# Patient Record
Sex: MALE | Race: WHITE | NOT HISPANIC OR LATINO | Employment: FULL TIME | URBAN - NONMETROPOLITAN AREA
[De-identification: names, ages, dates, MRNs, and addresses within clinical notes are randomized per-mention and may not be internally consistent; named-entity substitution may affect disease eponyms.]

---

## 2021-03-23 NOTE — PROGRESS NOTES
Assessment & Plan     Skin lesion  Discussed risk of malignancy.  Appointment with Dermatology scheduled for evaluation and recommendations for further management.   - DERMATOLOGY ADULT REFERRAL; Future    Tobacco use disorder  Begin Chantix as written.   - varenicline (CHANTIX DIYA) 0.5 MG X 11 & 1 MG X 42 tablet; Take 0.5 mg tab daily for 3 days, THEN 0.5 mg tab twice daily for 4 days, THEN 1 mg twice daily.  - varenicline (CHANTIX) 1 MG tablet; Take 1 tablet (1 mg) by mouth 2 times daily    Tobacco Cessation:   reports that he has been smoking cigarettes. He has been smoking about 1.00 pack per day. He has never used smokeless tobacco.  Tobacco Cessation Action Plan: Self help information given to patient    See Patient Instructions    No follow-ups on file.    Sheldon Landry MD  [facility] - BETHANY Reyes is a 50 year old who presents for the following health issues     HPI     Concern - Mole  Onset: 1-2 years  Description: Mole under right eye  Intensity: mild  Progression of Symptoms: getting larger  Accompanying Signs & Symptoms: none  Previous history of similar problem: none  Therapies tried and outcome: Mole got caught on facemask and ripped off 2 days ago, still has some remnant of mole    Eric noted a skin lesion as described above.  There is still remnants at the base.  No associated symptoms.     Patient is requesting information to quit smoking       Review of Systems   Constitutional, HEENT, cardiovascular, pulmonary, gi and gu systems are negative, except as otherwise noted.      Objective    There were no vitals taken for this visit.  There is no height or weight on file to calculate BMI.  Physical Exam  Vitals signs and nursing note reviewed.   Constitutional:       Appearance: He is well-developed.   Skin:     General: Skin is warm and dry.      Comments: There is a small slightly colored lesion noted inferior right eye.    Neurological:      Mental Status: He  is alert and oriented to person, place, and time.   Psychiatric:         Mood and Affect: Mood normal.         Behavior: Behavior normal.         Thought Content: Thought content normal.          No results found for any visits on 03/24/21.

## 2021-03-24 ENCOUNTER — OFFICE VISIT (OUTPATIENT)
Dept: FAMILY MEDICINE | Facility: OTHER | Age: 51
End: 2021-03-24
Attending: FAMILY MEDICINE
Payer: COMMERCIAL

## 2021-03-24 VITALS
HEART RATE: 74 BPM | BODY MASS INDEX: 26.5 KG/M2 | TEMPERATURE: 97.4 F | OXYGEN SATURATION: 95 % | RESPIRATION RATE: 20 BRPM | DIASTOLIC BLOOD PRESSURE: 84 MMHG | WEIGHT: 190 LBS | SYSTOLIC BLOOD PRESSURE: 128 MMHG

## 2021-03-24 DIAGNOSIS — F17.200 TOBACCO USE DISORDER: ICD-10-CM

## 2021-03-24 DIAGNOSIS — L98.9 SKIN LESION: Primary | ICD-10-CM

## 2021-03-24 PROCEDURE — 99213 OFFICE O/P EST LOW 20 MIN: CPT | Performed by: FAMILY MEDICINE

## 2021-03-24 RX ORDER — VARENICLINE TARTRATE 1 MG/1
1 TABLET, FILM COATED ORAL 2 TIMES DAILY
Qty: 60 TABLET | Refills: 3 | Status: SHIPPED | OUTPATIENT
Start: 2021-03-24 | End: 2021-12-23

## 2021-03-24 ASSESSMENT — PAIN SCALES - GENERAL: PAINLEVEL: NO PAIN (0)

## 2021-03-24 NOTE — NURSING NOTE
"Chief Complaint   Patient presents with     Mole       Initial /84 (BP Location: Right arm, Patient Position: Sitting, Cuff Size: Adult Regular)   Pulse 74   Temp 97.4  F (36.3  C) (Tympanic)   Resp 20   Wt 86.2 kg (190 lb)   SpO2 95%   BMI 26.50 kg/m   Estimated body mass index is 26.5 kg/m  as calculated from the following:    Height as of 1/5/15: 1.803 m (5' 11\").    Weight as of this encounter: 86.2 kg (190 lb).  Medication Reconciliation: complete  Angelo Perez LPN  "

## 2021-03-25 NOTE — PATIENT INSTRUCTIONS
"Appointment with Dermatology scheduled for evaluation and recommendations for further management.   Patient Education     Help for Smokers  What Are Your Reasons for Quitting?  Should I think about quitting smoking?  When it comes to lasting life change, readiness is everything. This may be the perfect time to quit using tobacco. If you have concerns about your health, this is your chance to reflect on your habits and make healthy changes.  Perhaps you have tried quitting in the past. It may help to think of quitting as a process that you take one day at a time. Every attempt brings you closer to success. And each time you try, you learn more about what works for you.  What are my reasons for quitting?  The first step in quitting is to decide why quitting is important to you. Here is one good reason:  \"Scientists have identified more than 7,000 chemicals and chemical compounds in tobacco smoke. At least 70 of them are known specifically to cause cancer.\"  --2014 Surgeon General Report  Consider health risks:    Smoking is the leading cause of heart disease, lung disease and stroke.    For the first time, women are as likely to die as men from many diseases caused by smoking.    Smoking can harm a person's health before birth and throughout his or her life.    Smoking causes cancer of the lungs, kidney, stomach, pancreas, cervix and bone marrow.    Smoking leads to impotence (loss of sexual function).    Smoking causes cataracts (clouding of the lens in the eye).    Smoking can cause you to lose teeth.    Smoking can lead to osteoporosis (brittle bone disease). This means a greater risk of broken bones. And if you smoke, your bones will heal more slowly.    Smoking leads to more infections. If you are healing from surgery, your incision has a greater chance of getting infected, which delays healing.    Smoking leads to more trips to the hospital--and longer stays.    Smokers are 30 to 40 percent more likely to " "develop type 2 diabetes than non-smokers.    About 3 out of 4 teen smokers become adult smokers, even if they plan to quit in a few years.    Secondhand smoke exposure is now known to cause strokes in nonsmokers.    More than 33,000 nonsmokers die every year in the [place] from coronary artery disease caused by exposure to secondhand smoke.  Consider personal reasons:    Smoking costs too much money.    My clothes stink.    I miss out on activities with my family because I am away smoking.    My breathing test showed I have the beginnings of emphysema.    A loved one  of cancer.    Smoking seems to control my life.  What other reasons do you have for quitting?  __________________________________________  __________________________________________  Does the way I talk to myself affect my ability to quit smoking?  If you tell yourself you can't quit, it will be very hard to quit. If you tell yourself you are a non-smoker, you will live up to that name.  Quitting happens one day at a time. When you feel a strong urge to smoke, think about your larger goal: to have a free and healthy life.  If I change my behaviors, am I more likely to succeed?  To go to the Super Bowl, a football team must have an action plan. Team members must prepare for the rojas to win. They have to have more than one play. And if they don't make it to the big game, they don't give up--they simply make a new plan for next time.  Like getting to the Super Bowl, quitting smoking calls for more than just willpower. Nicotine is a powerful, addictive drug. Withdrawal symptoms are much like those from cocaine or heroin. To fight them, you need many \"plays\" throughout the day.  Most people do not succeed the first time they try to quit smoking. It may take several tries, and you may need several action plans.  Start your day with a plan. Change how and where you do things. If you always smoke in a certain chair in the living room, don't go " "there. If you always have a cigarette with a drink, avoid drinking for a while. Other steps you might take:    Instead of smoking in the morning, brush your teeth when you first get up, then eat a healthy breakfast.    Instead of smoking in the car, stash some low-fat treats in the glove box. Or buy special music for the ride.    Instead of smoking after meals, clear the table and go for a walk.    Instead of smoking when you're under stress, do some deep-breathing exercises.  When you wake up the next day, start your action plan again. See yourself as a winner. Being free of nicotine puts you in control of your body and health. Don't take your eyes off that goal, even when the going gets rough.  What can I do about nicotine withdrawal?  Nicotine addiction is not about willpower or strength of character. It's about brain chemistry. Nicotine provides a \"kick,\" causing the brain to release chemicals into the body. These chemicals give you a feeling of pleasure. Depending on the dose, they can also make you feel calm. People smoke to keep high levels of these chemicals in the body. The pleasure they feel makes them want to keep smoking.  In the first 48 hours after quitting, nicotine withdrawal can be intense. You may feel irritable and have strong cigarette cravings. You might have a hard time going to sleep or concentrating while awake. You may want to eat more than usual.  The good news is, these symptoms peak within a few days. They should subside within a few weeks.  Do smoking aids help?  Yes. Using a smoking aid--such as a nicotine patch or a medicine like Chantix--can increase your chances for success. Ask your doctor which aid is best for you:    Nicotine patches    Nicotine gum or lozenges    Nicotine nasal spray or a nicotine inhaler (a plastic filter that you puff almost like a cigarette), prescribed by a doctor    Varenicline (Chantix) or bupropion (Zyban, Wellbutrin), prescribed by a doctor  Nicotine " replacement helps with the physical addiction. If you combine this with a quit-smoking program, you can double your chances for success.  What is my action plan?    Before you quit, make two lists: reasons you want to quit and reasons you don't. When your first list is twice as long as the second, you will be more likely to succeed.    Pick a quit date. Perhaps it was the day you came to the hospital or clinic.    Tell friends and family.    Stock up on carrots, sugar-free mints, cinnamon sticks and other items to keep your mouth busy.    Keep your hands busy with drawing, knitting, playing an instrument or other hobbies.    Find support. Will you use nicotine replacement? Attend a class? Join [employer]? Talk with friends who have quit?    Get rid of all cigarettes, lighters, ashtrays and other smoking items.    Keep active. Exercise will release pleasure chemicals in your brain, just like smoking did. Try walking, biking, gardening and other activities.    Drink lots of water.    Reduce or avoid alcohol.    Breathe deeply. When you smoked, you breathed the smoke deep into your lungs. Now picture your lungs filling with fresh, clean air.    Reward yourself with the money you saved by quitting. Go to a movie, take a vacation, buy a car.  Today, there are more former smokers than current smokers. Each year, over half of all daily smokers try to quit.  How quickly will my health improve?  Your body has an amazing way of healing itself over time, if you let it. Here are just a few of the long-term rewards of quitting. But don't forget--all these benefits will end if you light another cigarette.  The moment you stop smoking  The air around you is no longer harmful to children or other adults.  After 20 minutes  Your blood pressure and heart rate drop to normal.  Your hands and feet warm to a normal temperature.  After 8 hours  The carbon monoxide level in your blood drops to normal.  The oxygen level in your blood rises  to normal.  After 24 hours  Your chance of heart attack is lower.  After 48 hours  Your sense of smell and taste improve.  After 2 weeks to 3 months  Your blood flow improves.  It's easier for you to walk.  Your lungs work up to 30 percent better than before.  You catch colds and the flu less often.  After 1 to 9 months  You have less coughing and shortness of breath.  You have fewer sinus problems.  You have more energy.  You feel better about yourself because you've done what you set out to do.  After 1 year  You cut your risk of heart disease in half.  You lower your risk of cancer and lung disease.  You have fewer sick days and health problems.  You reduce the cost of your auto, home and life insurance.  After 10 years  Normal cells replace your pre-cancer cells.  You greatly lower your risk of mouth, larynx, lung and bladder cancer.  Your risk of artery disease is the same as if you had never smoked.  You--and anyone who was exposed to your second-hand smoke--will have a longer life expectancy.  For more information  QUITPLAN (0-855-647-PLAN), the [place] Tobacco Quit Line, provides free professional counseling over the phone.  www.quitplan.com  For informational purposes only. Not to replace the advice of your health care provider.  Copyright   2004 [facility]. All rights reserved. Clinically reviewed by Sultana Solis. [employer] [id number] - REV 08/18.

## 2021-05-06 ENCOUNTER — MYC MEDICAL ADVICE (OUTPATIENT)
Dept: FAMILY MEDICINE | Facility: OTHER | Age: 51
End: 2021-05-06

## 2021-05-06 DIAGNOSIS — H54.7 VISUAL PROBLEMS: Primary | ICD-10-CM

## 2021-05-09 ENCOUNTER — MYC MEDICAL ADVICE (OUTPATIENT)
Dept: FAMILY MEDICINE | Facility: OTHER | Age: 51
End: 2021-05-09

## 2021-05-09 DIAGNOSIS — L98.9 SKIN LESION: Primary | ICD-10-CM

## 2021-06-13 ENCOUNTER — HEALTH MAINTENANCE LETTER (OUTPATIENT)
Age: 51
End: 2021-06-13

## 2021-10-03 ENCOUNTER — HEALTH MAINTENANCE LETTER (OUTPATIENT)
Age: 51
End: 2021-10-03

## 2021-11-18 ENCOUNTER — OFFICE VISIT (OUTPATIENT)
Dept: FAMILY MEDICINE | Facility: OTHER | Age: 51
End: 2021-11-18
Attending: PHYSICIAN ASSISTANT
Payer: COMMERCIAL

## 2021-11-18 ENCOUNTER — NURSE TRIAGE (OUTPATIENT)
Dept: FAMILY MEDICINE | Facility: OTHER | Age: 51
End: 2021-11-18
Payer: COMMERCIAL

## 2021-11-18 ENCOUNTER — ANCILLARY PROCEDURE (OUTPATIENT)
Dept: GENERAL RADIOLOGY | Facility: OTHER | Age: 51
End: 2021-11-18
Attending: PHYSICIAN ASSISTANT
Payer: COMMERCIAL

## 2021-11-18 ENCOUNTER — MYC MEDICAL ADVICE (OUTPATIENT)
Dept: FAMILY MEDICINE | Facility: OTHER | Age: 51
End: 2021-11-18

## 2021-11-18 VITALS
DIASTOLIC BLOOD PRESSURE: 78 MMHG | HEIGHT: 71 IN | BODY MASS INDEX: 28 KG/M2 | SYSTOLIC BLOOD PRESSURE: 138 MMHG | TEMPERATURE: 97.3 F | OXYGEN SATURATION: 98 % | HEART RATE: 74 BPM | WEIGHT: 200 LBS

## 2021-11-18 DIAGNOSIS — R06.09 DOE (DYSPNEA ON EXERTION): Primary | ICD-10-CM

## 2021-11-18 DIAGNOSIS — R06.09 DOE (DYSPNEA ON EXERTION): ICD-10-CM

## 2021-11-18 PROCEDURE — 71046 X-RAY EXAM CHEST 2 VIEWS: CPT | Mod: TC | Performed by: RADIOLOGY

## 2021-11-18 PROCEDURE — 99214 OFFICE O/P EST MOD 30 MIN: CPT | Mod: 25 | Performed by: PHYSICIAN ASSISTANT

## 2021-11-18 PROCEDURE — 93000 ELECTROCARDIOGRAM COMPLETE: CPT | Performed by: INTERNAL MEDICINE

## 2021-11-18 ASSESSMENT — PAIN SCALES - GENERAL: PAINLEVEL: NO PAIN (0)

## 2021-11-18 ASSESSMENT — MIFFLIN-ST. JEOR: SCORE: 1794.32

## 2021-11-18 NOTE — PATIENT INSTRUCTIONS
Thank you for choosing [facility].   I have office hours 8:00 am to 4:30 pm on Monday's, Wednesday's, Thursday's and Friday's. My nurse and I are out of the office every Tuesday.    Following your visit, when your labs and diagnostic testing have returned, I will review then and you will be contacted by my nurse.  If you are on My Chart, you can also view results there.    For refills, notify your pharmacy regarding what you need and the pharmacy will generate a refill request. Do not call my nurse as she is unable to process refill request. Please plan ahead and allow 3-5 days for refill requests.    You will generally receive a reminder call the day prior to your appointment.  If you cannot attend your appointment, please cancel your appointment with as much notice as possible.  If there is a pattern of failure to present for your appointments, I cannot provide consistent, meaningful, ongoing care for you. It is very important to me that you come in for your care, so we can best assist you with your health care needs.    IMPORTANT:  Please note that it is my standard of practice to NOT participate in prescribing ongoing requested Narcotic Analgesic therapy, and/or participate in the prescribing of other controlled substances.  My nurse and I am happy to assist you with the process of referral for alternative pain management as needed, and other treatment modalities including but not limited to:  Physical Therapy, Physical Medicine and Rehab, Counseling, Chiropractic Care, Orthopedic Care, and non-narcotic medication management.     In the event that you need to be seen for emergent concerns and I am out of office,  please see one of my colleagues for acute concerns.  You may also present to  or ER.  I appreciate the opportunity to serve you and look forward to supporting your healthcare needs in the future. Please contact me with any questions or concerns that you may  have.    Sincerely,      Kristie Munson RN, PA-C

## 2021-11-18 NOTE — NURSING NOTE
"Chief Complaint   Patient presents with     Breathing Problem       Initial /78   Pulse 74   Temp 97.3  F (36.3  C)   Ht 1.803 m (5' 11\")   Wt 90.7 kg (200 lb)   SpO2 98%   BMI 27.89 kg/m   Estimated body mass index is 27.89 kg/m  as calculated from the following:    Height as of this encounter: 1.803 m (5' 11\").    Weight as of this encounter: 90.7 kg (200 lb).  Medication Reconciliation: complete  Silvana Rushing LPN  "

## 2021-11-18 NOTE — PROGRESS NOTES
"  Assessment & Plan     URBINA (dyspnea on exertion)  He is going to need some labs and ekg and xray.  Family hx of an aunt with heart disease. , he is going to be given PFT due to hx of asthma as a kid and long time tobacco use.  Will need this to be done in hospital.  Feels the dyspnea is different.  No associated sx of nausea , sweating or radiation of any pain or any chest pain.  We will however get an EKG and also a stress test.   Labs will be done fasting and smoking cessation was talked about but medicine is something that really bothers him when he try's to stop smoking.         - XR CHEST 2 VW (Clinic Performed); Future  - General PFT Lab (Please always keep checked); Future  - Pulmonary Function Test; Future  - EKG 12-lead complete w/read - (Clinic Performed)  - General PFT Lab (Please always keep checked)  - NM Lexiscan stress test; Future             BMI:   Estimated body mass index is 27.89 kg/m  as calculated from the following:    Height as of this encounter: 1.803 m (5' 11\").    Weight as of this encounter: 90.7 kg (200 lb).       See Patient Instructions    No follow-ups on file.    RUSLAN Beth  [facility] - BETHANY Reyes is a 50 year old who presents for the following health issues     HPI     Acute Illness  Acute illness concerns: SOB  Upon excertion  Onset/Duration: 1 month  Symptoms:  Fever: no  Chills/Sweats: no  Headache (location?): no  Sinus Pressure: no  Conjunctivitis:  no  Ear Pain: no  Rhinorrhea: no  Congestion: no  Sore Throat: no  Cough: yes  Wheeze: YES  Decreased Appetite: no  Nausea: no  Vomiting: no  Diarrhea: no  Dysuria/Freq.: no  Dysuria or Hematuria: no  Fatigue/Achiness: no  Sick/Strep Exposure: no  Therapies tried and outcome: None      Review of Systems   CONSTITUTIONAL: NEGATIVE for fever, chills, change in weight  INTEGUMENTARY/SKIN: NEGATIVE for worrisome rashes, moles or lesions  ENT/MOUTH: NEGATIVE for ear, mouth and throat " "problems  RESP:POSITIVE for cough-non productive, SOB/dyspnea and wheezing, his SOB feels much different from his asthma Shortness of breathe. Never has had Dyspnea at rest.   CV: NEGATIVE for chest pain, palpitations or peripheral edema  MUSCULOSKELETAL: NEGATIVE for significant arthralgias or myalgia  NEURO: NEGATIVE for weakness, dizziness or paresthesias  PSYCHIATRIC: has know anxiety. Not sleeping well.       Objective    /78   Pulse 74   Temp 97.3  F (36.3  C)   Ht 1.803 m (5' 11\")   Wt 90.7 kg (200 lb)   SpO2 98%   BMI 27.89 kg/m    Body mass index is 27.89 kg/m .  Physical Exam   GENERAL: healthy, alert and no distress  EYES: Eyes grossly normal to inspection, PERRL and conjunctivae and sclerae normal  HENT: ear canals and TM's normal, nose and mouth without ulcers or lesions  NECK: no adenopathy, no asymmetry, masses, or scars and thyroid normal to palpation  RESP: lungs clear to auscultation - no rales, rhonchi or wheezes  CV: regular rate and rhythm, normal S1 S2, no S3 or S4, no murmur, click or rub, no peripheral edema and peripheral pulses strong  ABDOMEN: soft, nontender, no hepatosplenomegaly, no masses and bowel sounds normal  MS: no gross musculoskeletal defects noted, no edema  SKIN: no suspicious lesions or rashes  NEURO: Normal strength and tone, mentation intact and speech normal  PSYCH: mentation appears normal, affect normal/bright                "

## 2021-11-18 NOTE — TELEPHONE ENCOUNTER
"Cell phone [phone]      Patient denies covid exposure or covid symptoms.  Patient is vaccinated for covid.  He has been sob the past month and decided today it has gone on long enough and should be check out.  Denies chest pain.  Patient requesting to be seen asap.  Reason for Disposition    Patient wants to be seen    MILD difficulty breathing (e.g., minimal/no SOB at rest, SOB with walking, pulse < 100) of new onset or worse than normal    Answer Assessment - Initial Assessment Questions  1. RESPIRATORY STATUS: \"Describe your breathing?\" (e.g., wheezing, shortness of breath, unable to speak, severe coughing)       Patient feels like he just ran a marathon or sob when climboing stairs  2. ONSET: \"When did this breathing problem begin?\"       One month ago  3. PATTERN \"Does the difficult breathing come and go, or has it been constant since it started?\"       Continuous upon exertion, walking, climbing stairs, not at rest  4. SEVERITY: \"How bad is your breathing?\" (e.g., mild, moderate, severe)     - MILD: No SOB at rest, mild SOB with walking, speaks normally in sentences, can lay down, no retractions, pulse < 100.     - MODERATE: SOB at rest, SOB with minimal exertion and prefers to sit, cannot lie down flat, speaks in phrases, mild retractions, audible wheezing, pulse 100-120.     - SEVERE: Very SOB at rest, speaks in single words, struggling to breathe, sitting hunched forward, retractions, pulse > 120       mild  5. RECURRENT SYMPTOM: \"Have you had difficulty breathing before?\" If so, ask: \"When was the last time?\" and \"What happened that time?\"       no  6. CARDIAC HISTORY: \"Do you have any history of heart disease?\" (e.g., heart attack, angina, bypass surgery, angioplasty)       no  7. LUNG HISTORY: \"Do you have any history of lung disease?\"  (e.g., pulmonary embolus, asthma, emphysema)      no  8. CAUSE: \"What do you think is causing the breathing problem?\"       unknown  9. OTHER SYMPTOMS: \"Do you have " "any other symptoms? (e.g., dizziness, runny nose, cough, chest pain, fever)      no  10. PREGNANCY: \"Is there any chance you are pregnant?\" \"When was your last menstrual period?\"        no  11. TRAVEL: \"Have you traveled out of the country in the last month?\" (e.g., travel history, exposures)        no    Protocols used: BREATHING DIFFICULTY-A-[place]      "

## 2021-11-22 ENCOUNTER — LAB (OUTPATIENT)
Dept: LAB | Facility: OTHER | Age: 51
End: 2021-11-22
Payer: COMMERCIAL

## 2021-11-22 DIAGNOSIS — R06.09 DOE (DYSPNEA ON EXERTION): ICD-10-CM

## 2021-11-22 LAB
ALBUMIN SERPL-MCNC: 3.9 G/DL (ref 3.4–5)
ALP SERPL-CCNC: 53 U/L (ref 40–150)
ALT SERPL W P-5'-P-CCNC: 48 U/L (ref 0–70)
ANION GAP SERPL CALCULATED.3IONS-SCNC: 4 MMOL/L (ref 3–14)
AST SERPL W P-5'-P-CCNC: 22 U/L (ref 0–45)
BASOPHILS # BLD AUTO: 0.1 10E3/UL (ref 0–0.2)
BASOPHILS NFR BLD AUTO: 1 %
BILIRUB SERPL-MCNC: 0.6 MG/DL (ref 0.2–1.3)
BUN SERPL-MCNC: 11 MG/DL (ref 7–30)
CALCIUM SERPL-MCNC: 8.7 MG/DL (ref 8.5–10.1)
CHLORIDE BLD-SCNC: 109 MMOL/L (ref 94–109)
CHOLEST SERPL-MCNC: 262 MG/DL
CO2 SERPL-SCNC: 27 MMOL/L (ref 20–32)
CREAT SERPL-MCNC: 0.96 MG/DL (ref 0.66–1.25)
EOSINOPHIL # BLD AUTO: 0.2 10E3/UL (ref 0–0.7)
EOSINOPHIL NFR BLD AUTO: 2 %
ERYTHROCYTE [DISTWIDTH] IN BLOOD BY AUTOMATED COUNT: 12.7 % (ref 10–15)
FASTING STATUS PATIENT QL REPORTED: YES
GFR SERPL CREATININE-BSD FRML MDRD: >90 ML/MIN/1.73M2
GLUCOSE BLD-MCNC: 94 MG/DL (ref 70–99)
HCT VFR BLD AUTO: 49.2 % (ref 40–53)
HDLC SERPL-MCNC: 47 MG/DL
HGB BLD-MCNC: 17.2 G/DL (ref 13.3–17.7)
IMM GRANULOCYTES # BLD: 0 10E3/UL
IMM GRANULOCYTES NFR BLD: 0 %
LDLC SERPL CALC-MCNC: 179 MG/DL
LYMPHOCYTES # BLD AUTO: 2.2 10E3/UL (ref 0.8–5.3)
LYMPHOCYTES NFR BLD AUTO: 26 %
MCH RBC QN AUTO: 32.3 PG (ref 26.5–33)
MCHC RBC AUTO-ENTMCNC: 35 G/DL (ref 31.5–36.5)
MCV RBC AUTO: 93 FL (ref 78–100)
MONOCYTES # BLD AUTO: 0.5 10E3/UL (ref 0–1.3)
MONOCYTES NFR BLD AUTO: 6 %
NEUTROPHILS # BLD AUTO: 5.4 10E3/UL (ref 1.6–8.3)
NEUTROPHILS NFR BLD AUTO: 65 %
NONHDLC SERPL-MCNC: 215 MG/DL
NRBC # BLD AUTO: 0 10E3/UL
NRBC BLD AUTO-RTO: 0 /100
PLATELET # BLD AUTO: 280 10E3/UL (ref 150–450)
POTASSIUM BLD-SCNC: 4 MMOL/L (ref 3.4–5.3)
PROT SERPL-MCNC: 8 G/DL (ref 6.8–8.8)
RBC # BLD AUTO: 5.32 10E6/UL (ref 4.4–5.9)
SODIUM SERPL-SCNC: 140 MMOL/L (ref 133–144)
TRIGL SERPL-MCNC: 178 MG/DL
TSH SERPL DL<=0.005 MIU/L-ACNC: 1.22 MU/L (ref 0.4–4)
WBC # BLD AUTO: 8.4 10E3/UL (ref 4–11)

## 2021-11-22 PROCEDURE — 80050 GENERAL HEALTH PANEL: CPT

## 2021-11-22 PROCEDURE — 80061 LIPID PANEL: CPT

## 2021-11-22 PROCEDURE — 36415 COLL VENOUS BLD VENIPUNCTURE: CPT

## 2021-12-14 ENCOUNTER — HOSPITAL ENCOUNTER (OUTPATIENT)
Dept: NUCLEAR MEDICINE | Facility: HOSPITAL | Age: 51
Setting detail: NUCLEAR MEDICINE
End: 2021-12-14
Attending: PHYSICIAN ASSISTANT
Payer: COMMERCIAL

## 2021-12-14 ENCOUNTER — HOSPITAL ENCOUNTER (OUTPATIENT)
Dept: CARDIOLOGY | Facility: HOSPITAL | Age: 51
Setting detail: NUCLEAR MEDICINE
End: 2021-12-14
Attending: PHYSICIAN ASSISTANT
Payer: COMMERCIAL

## 2021-12-14 DIAGNOSIS — R06.09 DOE (DYSPNEA ON EXERTION): ICD-10-CM

## 2021-12-14 LAB
CV BLOOD PRESSURE: 65 MMHG
CV STRESS MAX HR HE: 80
NUC STRESS EJECTION FRACTION: 59 %
RATE PRESSURE PRODUCT: 8160
STRESS ECHO BASELINE DIASTOLIC HE: 72
STRESS ECHO BASELINE HR: 54 BPM
STRESS ECHO BASELINE SYSTOLIC BP: 110
STRESS ECHO CALCULATED PERCENT HR: 47 %
STRESS ECHO LAST STRESS DIASTOLIC BP: 70
STRESS ECHO LAST STRESS SYSTOLIC BP: 102
STRESS ECHO TARGET HR: 170

## 2021-12-14 PROCEDURE — 93016 CV STRESS TEST SUPVJ ONLY: CPT | Performed by: INTERNAL MEDICINE

## 2021-12-14 PROCEDURE — 343N000001 HC RX 343: Performed by: RADIOLOGY

## 2021-12-14 PROCEDURE — A9500 TC99M SESTAMIBI: HCPCS | Performed by: RADIOLOGY

## 2021-12-14 PROCEDURE — 250N000011 HC RX IP 250 OP 636: Performed by: INTERNAL MEDICINE

## 2021-12-14 PROCEDURE — 78452 HT MUSCLE IMAGE SPECT MULT: CPT

## 2021-12-14 PROCEDURE — 93017 CV STRESS TEST TRACING ONLY: CPT | Performed by: INTERNAL MEDICINE

## 2021-12-14 PROCEDURE — 93018 CV STRESS TEST I&R ONLY: CPT | Performed by: INTERNAL MEDICINE

## 2021-12-14 RX ORDER — AMINOPHYLLINE 25 MG/ML
INJECTION, SOLUTION INTRAVENOUS
Status: DISCONTINUED
Start: 2021-12-14 | End: 2021-12-14 | Stop reason: WASHOUT

## 2021-12-14 RX ORDER — REGADENOSON 0.08 MG/ML
0.4 INJECTION, SOLUTION INTRAVENOUS ONCE
Status: COMPLETED | OUTPATIENT
Start: 2021-12-14 | End: 2021-12-14

## 2021-12-14 RX ADMIN — Medication 30.8 MILLICURIE: at 09:15

## 2021-12-14 RX ADMIN — REGADENOSON 0.4 MG: 0.08 INJECTION, SOLUTION INTRAVENOUS at 09:17

## 2021-12-14 RX ADMIN — Medication 10.8 MILLICURIE: at 07:14

## 2021-12-17 NOTE — PROGRESS NOTES
Assessment & Plan     URBINA (dyspnea on exertion)  He shows not changes on his cardiac work up.  He has great pressure and a slow heart beat.  EF is good. He is now going on to work up his breathing.  Concern for deconditioning as well.  We will get him ion an exercise program if he is ok on his lungs as well. If not medication as appropriate. Discussion on smoking as a problem here. chantix he quit due to panic and anxiety     Special screening for malignant neoplasms, colon  Hx of diverticulosis never had a scope and is 51 recommended screening.  See us back as recommended.   - Adult General Surg Referral    Review of external notes as documented elsewhere in note  Ordering of each unique test  Prescription drug management  10  minutes spent on the date of the encounter doing chart review, review of test results, interpretation of tests, patient visit and documentation        See Patient Instructions    No follow-ups on file.    RUSLAN Beth  [facility] - BETHANY Reyes is a 51 year old who presents for the following health issues     HPI     Concern - Dyspnea on exertion   Onset: 11/18/2021  Description: short of breath  Intensity: mild  Progression of Symptoms:  Is still feeling short of breath after any kind of exercise, has steps at work, and after shoveling.   Accompanying Signs & Symptoms: none  Previous history of similar problem: none  Precipitating factors:        Worsened by: none  Alleviating factors:        Improved by: none  Therapies tried and outcome: None        Review of Systems   CONSTITUTIONAL: NEGATIVE for fever, chills, change in weight  INTEGUMENTARY/SKIN: no changes in skin   ENT/MOUTH: NEGATIVE for ear, mouth and throat problems  RESP: NEGATIVE for significant cough or SOB  CV: NEGATIVE for chest pain, palpitations or peripheral edema  PSYCHIATRIC: NEGATIVE for changes in mood or affect      Objective    /80 (BP Location: Left arm, Patient  "Position: Sitting, Cuff Size: Adult Regular)   Pulse 70   Temp 97.1  F (36.2  C) (Tympanic)   Ht 1.803 m (5' 11\")   Wt 90.7 kg (200 lb)   SpO2 98%   BMI 27.89 kg/m    Body mass index is 27.89 kg/m .  Physical Exam   GENERAL: healthy, alert and no distress  EYES: Eyes grossly normal to inspection, PERRL and conjunctivae and sclerae normal  HENT: ear canals and TM's normal, nose and mouth without ulcers or lesions  NECK: no adenopathy, no asymmetry, masses, or scars and thyroid normal to palpation  RESP: lungs clear to auscultation - no rales, rhonchi or wheezes  CV: regular rate and rhythm, normal S1 S2, no S3 or S4, no murmur, click or rub, no peripheral edema and peripheral pulses strong  MS: no gross musculoskeletal defects noted, no edema  SKIN: no suspicious lesions or rashes  NEURO: Normal strength and tone, mentation intact and speech normal  PSYCH: mentation appears normal, affect normal/[facility]    Hospital Outpatient Visit on 12/14/2021   Component Date Value Ref Range Status     Target HR 12/14/2021 170   Final     Baseline Systolic BP 12/14/2021 110   Final     Baseline Diastolic BP 12/14/2021 72   Final     Last Stress Systolic BP 12/14/2021 102   Final     Last Stress Diastolic BP 12/14/2021 70   Final     Baseline HR 12/14/2021 54  bpm Final     Max HR  12/14/2021 80   Final     Max Perdicted HR  12/14/2021 47  % Final     Rate Pressure Product 12/14/2021 8,160.0   Final     BP 12/14/2021 65  mmHg Final     Left Ventricular EF 12/14/2021 59  % Final               "

## 2021-12-23 ENCOUNTER — OFFICE VISIT (OUTPATIENT)
Dept: FAMILY MEDICINE | Facility: OTHER | Age: 51
End: 2021-12-23
Attending: PHYSICIAN ASSISTANT
Payer: COMMERCIAL

## 2021-12-23 VITALS
HEART RATE: 70 BPM | DIASTOLIC BLOOD PRESSURE: 80 MMHG | SYSTOLIC BLOOD PRESSURE: 126 MMHG | OXYGEN SATURATION: 98 % | WEIGHT: 200 LBS | BODY MASS INDEX: 28 KG/M2 | HEIGHT: 71 IN | TEMPERATURE: 97.1 F

## 2021-12-23 DIAGNOSIS — Z12.11 SPECIAL SCREENING FOR MALIGNANT NEOPLASMS, COLON: ICD-10-CM

## 2021-12-23 DIAGNOSIS — E78.5 HYPERLIPIDEMIA LDL GOAL <100: ICD-10-CM

## 2021-12-23 DIAGNOSIS — R06.09 DOE (DYSPNEA ON EXERTION): Primary | ICD-10-CM

## 2021-12-23 PROCEDURE — 99213 OFFICE O/P EST LOW 20 MIN: CPT | Performed by: PHYSICIAN ASSISTANT

## 2021-12-23 ASSESSMENT — PAIN SCALES - GENERAL: PAINLEVEL: NO PAIN (0)

## 2021-12-23 ASSESSMENT — MIFFLIN-ST. JEOR: SCORE: 1789.32

## 2021-12-23 NOTE — NURSING NOTE
"Chief Complaint   Patient presents with     Breathing Problem       Initial /80 (BP Location: Left arm, Patient Position: Sitting, Cuff Size: Adult Regular)   Pulse 70   Temp 97.1  F (36.2  C) (Tympanic)   Ht 1.803 m (5' 11\")   Wt 90.7 kg (200 lb)   SpO2 98%   BMI 27.89 kg/m   Estimated body mass index is 27.89 kg/m  as calculated from the following:    Height as of this encounter: 1.803 m (5' 11\").    Weight as of this encounter: 90.7 kg (200 lb).  Medication Reconciliation: complete  Lakeisha Lagos LPN  "

## 2022-01-28 ENCOUNTER — TELEPHONE (OUTPATIENT)
Dept: SURGERY | Facility: OTHER | Age: 52
End: 2022-01-28
Payer: COMMERCIAL

## 2022-01-28 NOTE — TELEPHONE ENCOUNTER
Referral received for colonoscopy for screening.   This patient was approved by Shannan Hahn, surgery education RN for meet and greet colonoscopy without preop appointment.   1st attempt to call patient to schedule. No answer. Left message for patient to return call. My-chart message also sent with Gatorade instructions.

## 2022-03-14 ENCOUNTER — TELEPHONE (OUTPATIENT)
Dept: SURGERY | Facility: OTHER | Age: 52
End: 2022-03-14
Payer: COMMERCIAL

## 2022-03-14 NOTE — TELEPHONE ENCOUNTER
Referral received for colonoscopy for special screening for malignant neoplasms, colon.   This patient was approved by Shannan, surgery education RN for meet and greet without preop.   This is the 2nd attempt by phone to schedule meet and greet colonoscopy. No answer.   Courtesy letter with contact information sent requesting patient to call office to schedule colonoscopy/consult at patient's convenience.     Referring provider notified that patient has not yet been scheduled for colonoscopy or consult after attempts have been made schedule by phone.

## 2022-07-09 ENCOUNTER — HEALTH MAINTENANCE LETTER (OUTPATIENT)
Age: 52
End: 2022-07-09

## 2022-09-04 ENCOUNTER — HEALTH MAINTENANCE LETTER (OUTPATIENT)
Age: 52
End: 2022-09-04

## 2022-10-30 ENCOUNTER — MYC MEDICAL ADVICE (OUTPATIENT)
Dept: FAMILY MEDICINE | Facility: OTHER | Age: 52
End: 2022-10-30

## 2022-10-31 ENCOUNTER — MYC MEDICAL ADVICE (OUTPATIENT)
Dept: FAMILY MEDICINE | Facility: OTHER | Age: 52
End: 2022-10-31

## 2022-10-31 ENCOUNTER — NURSE TRIAGE (OUTPATIENT)
Dept: FAMILY MEDICINE | Facility: OTHER | Age: 52
End: 2022-10-31

## 2022-10-31 NOTE — TELEPHONE ENCOUNTER
Protocol:  See in office today or tomorrow    Sent to PCP to review & advise if a work-into the schedule is warranted.            Reason for Disposition    MILD pain (e.g., does not interfere with normal activities) and pain comes and goes (cramps) lasts > 48 hours  (Exception: This same abdominal pain is a chronic symptom recurrent or ongoing AND present > 4 weeks.)    Additional Information    Negative: Passed out (i.e., fainted, collapsed and was not responding)    Negative: Shock suspected (e.g., cold/pale/clammy skin, too weak to stand, low BP, rapid pulse)    Negative: Sounds like a life-threatening emergency to the triager    Negative: Chest pain    Negative: Pain is mainly in upper abdomen (if needed ask: 'is it mainly above the belly button?')    Negative: SEVERE abdominal pain (e.g., excruciating)    Negative: Vomiting red blood or black (coffee ground) material    Negative: Bloody, black, or tarry bowel movements  (Exception: Chronic-unchanged black-grey bowel movements and is taking iron pills or Pepto-Bismol.)    Negative: Unable to urinate (or only a few drops) and bladder feels very full    Negative: Pain in scrotum persists > 1 hour    Negative: Constant abdominal pain lasting > 2 hours    Negative: Vomiting bile (green color)    Negative: Patient sounds very sick or weak to the triager    Negative: Vomiting and abdomen looks much more swollen than usual    Negative: White of the eyes have turned yellow (i.e., jaundice)    Negative: Blood in urine (red, pink, or tea-colored)    Negative: Fever > 103 F (39.4 C)    Negative: Fever > 101 F (38.3 C) and over 60 years of age    Negative: Fever > 100.0 F (37.8 C) and has diabetes mellitus or a weak immune system (e.g., HIV positive, cancer chemotherapy, organ transplant, splenectomy, chronic steroids)    Negative: Fever > 100.0 F (37.8 C) and bedridden (e.g., nursing home patient, stroke, chronic illness, recovering from surgery)    Negative: MODERATE pain  "(e.g., interferes with normal activities) that comes and goes (cramps) lasts > 24 hours  (Exception: pain with Vomiting or Diarrhea - see that Protocol)    Negative: Age > 60 years    Negative: Patient wants to be seen    Answer Assessment - Initial Assessment Questions  1. LOCATION: \"Where does it hurt?\"       Bilat mid abd  2. RADIATION: \"Does the pain shoot anywhere else?\" (e.g., chest, back)      No  3. ONSET: \"When did the pain begin?\" (Minutes, hours or days ago)       Approx 10/29  4. SUDDEN: \"Gradual or sudden onset?\"      Started with a fever, 101.F  Then remained  5. PATTERN \"Does the pain come and go, or is it constant?\"     - If constant: \"Is it getting better, staying the same, or worsening?\"       (Note: Constant means the pain never goes away completely; most serious pain is constant and it progresses)      - If intermittent: \"How long does it last?\" \"Do you have pain now?\"      (Note: Intermittent means the pain goes away completely between bouts)      Slight increase with movement.  6. SEVERITY: \"How bad is the pain?\"  (e.g., Scale 1-10; mild, moderate, or severe)     - MILD (1-3): doesn't interfere with normal activities, abdomen soft and not tender to touch      - MODERATE (4-7): interferes with normal activities or awakens from sleep, abdomen tender to touch      - SEVERE (8-10): excruciating pain, doubled over, unable to do any normal activities        moderate  7. RECURRENT SYMPTOM: \"Have you ever had this type of stomach pain before?\" If Yes, ask: \"When was the last time?\" and \"What happened that time?\"       Felt these symptoms approx two weeks ago  8. CAUSE: \"What do you think is causing the stomach pain?\"      unknown  9. RELIEVING/AGGRAVATING FACTORS: \"What makes it better or worse?\" (e.g., movement, antacids, bowel movement)      Taking MOM, BM's are formed to soft  10. OTHER SYMPTOMS: \"Do you have any other symptoms?\" (e.g., back pain, diarrhea, fever, urination pain, vomiting)        " Fever this episode.    Protocols used: ABDOMINAL PAIN - MALE-A-OH

## 2022-11-01 ENCOUNTER — TELEPHONE (OUTPATIENT)
Dept: SURGERY | Facility: OTHER | Age: 52
End: 2022-11-01

## 2022-11-01 ENCOUNTER — OFFICE VISIT (OUTPATIENT)
Dept: FAMILY MEDICINE | Facility: OTHER | Age: 52
End: 2022-11-01
Attending: STUDENT IN AN ORGANIZED HEALTH CARE EDUCATION/TRAINING PROGRAM
Payer: COMMERCIAL

## 2022-11-01 VITALS
BODY MASS INDEX: 27.2 KG/M2 | RESPIRATION RATE: 18 BRPM | HEART RATE: 83 BPM | TEMPERATURE: 98.5 F | DIASTOLIC BLOOD PRESSURE: 62 MMHG | WEIGHT: 195 LBS | SYSTOLIC BLOOD PRESSURE: 118 MMHG | OXYGEN SATURATION: 98 %

## 2022-11-01 DIAGNOSIS — R10.32 ABDOMINAL PAIN, LEFT LOWER QUADRANT: Primary | ICD-10-CM

## 2022-11-01 DIAGNOSIS — K57.30 DIVERTICULOSIS OF LARGE INTESTINE WITHOUT HEMORRHAGE: Chronic | ICD-10-CM

## 2022-11-01 PROBLEM — E78.5 HYPERLIPIDEMIA LDL GOAL <100: Chronic | Status: ACTIVE | Noted: 2021-12-23

## 2022-11-01 LAB
ALBUMIN SERPL BCG-MCNC: 4 G/DL (ref 3.5–5.2)
ALBUMIN UR-MCNC: NEGATIVE MG/DL
ALP SERPL-CCNC: 55 U/L (ref 40–129)
ALT SERPL W P-5'-P-CCNC: 26 U/L (ref 10–50)
ANION GAP SERPL CALCULATED.3IONS-SCNC: 9 MMOL/L (ref 7–15)
APPEARANCE UR: CLEAR
AST SERPL W P-5'-P-CCNC: 20 U/L (ref 10–50)
BASOPHILS # BLD AUTO: 0.1 10E3/UL (ref 0–0.2)
BASOPHILS NFR BLD AUTO: 1 %
BILIRUB SERPL-MCNC: 0.4 MG/DL
BILIRUB UR QL STRIP: NEGATIVE
BUN SERPL-MCNC: 11.3 MG/DL (ref 6–20)
CALCIUM SERPL-MCNC: 8.9 MG/DL (ref 8.6–10)
CHLORIDE SERPL-SCNC: 104 MMOL/L (ref 98–107)
COLOR UR AUTO: YELLOW
CREAT SERPL-MCNC: 1.01 MG/DL (ref 0.67–1.17)
CRP SERPL-MCNC: 60.65 MG/L
DEPRECATED HCO3 PLAS-SCNC: 25 MMOL/L (ref 22–29)
EOSINOPHIL # BLD AUTO: 0.2 10E3/UL (ref 0–0.7)
EOSINOPHIL NFR BLD AUTO: 3 %
ERYTHROCYTE [DISTWIDTH] IN BLOOD BY AUTOMATED COUNT: 12.8 % (ref 10–15)
GFR SERPL CREATININE-BSD FRML MDRD: >90 ML/MIN/1.73M2
GLUCOSE SERPL-MCNC: 105 MG/DL (ref 70–99)
GLUCOSE UR STRIP-MCNC: NEGATIVE MG/DL
HCT VFR BLD AUTO: 47 % (ref 40–53)
HGB BLD-MCNC: 16.1 G/DL (ref 13.3–17.7)
HGB UR QL STRIP: NEGATIVE
HYALINE CASTS: 1 /LPF
IMM GRANULOCYTES # BLD: 0 10E3/UL
IMM GRANULOCYTES NFR BLD: 0 %
KETONES UR STRIP-MCNC: NEGATIVE MG/DL
LEUKOCYTE ESTERASE UR QL STRIP: NEGATIVE
LYMPHOCYTES # BLD AUTO: 2 10E3/UL (ref 0.8–5.3)
LYMPHOCYTES NFR BLD AUTO: 25 %
MCH RBC QN AUTO: 32.5 PG (ref 26.5–33)
MCHC RBC AUTO-ENTMCNC: 34.3 G/DL (ref 31.5–36.5)
MCV RBC AUTO: 95 FL (ref 78–100)
MONOCYTES # BLD AUTO: 0.6 10E3/UL (ref 0–1.3)
MONOCYTES NFR BLD AUTO: 7 %
MUCOUS THREADS #/AREA URNS LPF: PRESENT /LPF
NEUTROPHILS # BLD AUTO: 5.3 10E3/UL (ref 1.6–8.3)
NEUTROPHILS NFR BLD AUTO: 64 %
NITRATE UR QL: NEGATIVE
NRBC # BLD AUTO: 0 10E3/UL
NRBC BLD AUTO-RTO: 0 /100
PH UR STRIP: 6 [PH] (ref 4.7–8)
PLATELET # BLD AUTO: 272 10E3/UL (ref 150–450)
POTASSIUM SERPL-SCNC: 4.2 MMOL/L (ref 3.4–5.3)
PROT SERPL-MCNC: 7.4 G/DL (ref 6.4–8.3)
RBC # BLD AUTO: 4.96 10E6/UL (ref 4.4–5.9)
RBC URINE: <1 /HPF
SODIUM SERPL-SCNC: 138 MMOL/L (ref 136–145)
SP GR UR STRIP: 1.02 (ref 1–1.03)
SQUAMOUS EPITHELIAL: 0 /HPF
UROBILINOGEN UR STRIP-MCNC: NORMAL MG/DL
WBC # BLD AUTO: 8.3 10E3/UL (ref 4–11)
WBC URINE: 5 /HPF

## 2022-11-01 PROCEDURE — 36415 COLL VENOUS BLD VENIPUNCTURE: CPT | Performed by: STUDENT IN AN ORGANIZED HEALTH CARE EDUCATION/TRAINING PROGRAM

## 2022-11-01 PROCEDURE — 80053 COMPREHEN METABOLIC PANEL: CPT | Performed by: STUDENT IN AN ORGANIZED HEALTH CARE EDUCATION/TRAINING PROGRAM

## 2022-11-01 PROCEDURE — 81001 URINALYSIS AUTO W/SCOPE: CPT | Performed by: STUDENT IN AN ORGANIZED HEALTH CARE EDUCATION/TRAINING PROGRAM

## 2022-11-01 PROCEDURE — 99214 OFFICE O/P EST MOD 30 MIN: CPT | Performed by: STUDENT IN AN ORGANIZED HEALTH CARE EDUCATION/TRAINING PROGRAM

## 2022-11-01 PROCEDURE — 85025 COMPLETE CBC W/AUTO DIFF WBC: CPT | Performed by: STUDENT IN AN ORGANIZED HEALTH CARE EDUCATION/TRAINING PROGRAM

## 2022-11-01 PROCEDURE — 86140 C-REACTIVE PROTEIN: CPT | Performed by: STUDENT IN AN ORGANIZED HEALTH CARE EDUCATION/TRAINING PROGRAM

## 2022-11-01 ASSESSMENT — PAIN SCALES - GENERAL: PAINLEVEL: MILD PAIN (2)

## 2022-11-01 NOTE — TELEPHONE ENCOUNTER
Spoke to Cris ([phone]). She will give Eric the options & let him call us back. I gave her the direct number to give him to call us back. [phone]

## 2022-11-01 NOTE — PROGRESS NOTES
Assessment & Plan     Abdominal pain, left lower quadrant  Diverticulosis of large intestine without hemorrhage  4 days into recent episode, had intense left lower quadrant pain with a fever Saturday, has improved since then, although still some lingering discomfort.  Presently no nausea or changes in stool.  No overt red flag findings.  CBC, CMP reassuring.  UA negative.  CRP is elevated, possibly related to resolving flare versus less likely inflammatory bowel disease.  Unable to get CT scan this afternoon per radiology, scheduled for tomorrow morning. Strong suspicion for recurring diverticulitis, as diverticulosis of sigmoid previously noted on CT. Signs I would indicate urgent repeat evaluation discussed and he would present to the emergency department if imaging needed sooner.  Encouraged him to continue to hydrate and stick to a more liquid diet until symptoms improve further.  Does have an appointment with general surgery in 2 days, previously scheduled. Will plan follow up pending CT scan.   - CT Abdomen Pelvis w Contrast; Future  - UA with Microscopic reflex to Culture - [place]  - CRP, inflammation  - Comprehensive metabolic panel  - CBC with Platelets & Differential      I spent a total of 35 minutes on the day of the visit.   Time spent doing chart review, history and exam, documentation and further activities per the note       Sabrina Dill MD  [facility] - [place]    Ilan Reyes is a 51 year old, presenting for the following health issues:  Abdominal Pain      HPI   ABDOMINAL PAIN (and/or Flank Pain)    Onset: 2 week(s) ago noticed a flare, has been told that he has diverticulosis    Description:   Location: bilateral lower quadrant pelvic region  Radiation: None  Character: Stabbing and Cramping episodes that last 2 days.    Frequency (if intermittent):not applicable        Pain-free between episodes: YES    History:    Previous similar pain? YES   Previous tests done? Not  sure   Any related trauma?: no    Accompanying Signs & Symptoms:   Fever? YES - one day on Saturday   Nausea No  Vomiting (bloody?, coffee-grounds?) no  Dysuria? No  Hematuria: No  Change in stool color: no  Change in stool pattern: no, felt constipated 4-5 weeks ago   Weight loss: No     Precipitating and/or Alleviating factors:   Does the pain change with: Food No                                                BM No                                                Body movement No                                                 Urination No    Therapies tried and outcome: milk of mag with  moderate relief    Started with a fever on Saturday   Pain was in general abdomen, more lower on both sides  Pain is constant - does get a local spot he can feel with episodes on left side   Maybe a little back pain  No dysuria, no frequency  History of kidney stones  Has colonoscopy consult on Thursday   Last happened two weekends ago - resolved in a few days   Seems to flare up every 6 weeks to 2 months  Flare ups have become more regular over the past year   Poops are normal, not bloody or mucosy   Has done milk of magnesia, ?if it helps  Pain 6/10 initially, 3/10 now, improved   No previous imaging showing diverticulitis, only showed diverticulosis           Objective    /62   Pulse 83   Temp 98.5  F (36.9  C) (Tympanic)   Resp 18   Wt 88.5 kg (195 lb)   SpO2 98%   BMI 27.20 kg/m    Body mass index is 27.2 kg/m .     Physical Exam  Constitutional:       General: He is not in acute distress.     Appearance: Normal appearance. He is not ill-appearing.   Eyes:      General: No scleral icterus.     Conjunctiva/sclera: Conjunctivae normal.   Cardiovascular:      Rate and Rhythm: Normal rate and regular rhythm.      Heart sounds: No murmur heard.  Pulmonary:      Effort: Pulmonary effort is normal.      Breath sounds: Normal breath sounds.   Abdominal:      General: Abdomen is flat. There is no distension.       Palpations: There is no hepatomegaly, splenomegaly or mass.      Tenderness: There is abdominal tenderness (LLQ). There is no right CVA tenderness, left CVA tenderness, guarding or rebound. Negative signs include Dominique's sign.      Hernia: There is no hernia in the umbilical area or ventral area.   Skin:     Capillary Refill: Capillary refill takes less than 2 seconds.      Coloration: Skin is not jaundiced.      Findings: No rash.   Neurological:      General: No focal deficit present.      Mental Status: He is alert and oriented to person, place, and time.            Results for orders placed or performed in visit on 11/01/22 (from the past 24 hour(s))   CRP, inflammation   Result Value Ref Range    CRP Inflammation 60.65 (H) <5.00 mg/L   Comprehensive metabolic panel   Result Value Ref Range    Sodium 138 136 - 145 mmol/L    Potassium 4.2 3.4 - 5.3 mmol/L    Chloride 104 98 - 107 mmol/L    Carbon Dioxide (CO2) 25 22 - 29 mmol/L    Anion Gap 9 7 - 15 mmol/L    Urea Nitrogen 11.3 6.0 - 20.0 mg/dL    Creatinine 1.01 0.67 - 1.17 mg/dL    Calcium 8.9 8.6 - 10.0 mg/dL    Glucose 105 (H) 70 - 99 mg/dL    Alkaline Phosphatase 55 40 - 129 U/L    AST 20 10 - 50 U/L    ALT 26 10 - 50 U/L    Protein Total 7.4 6.4 - 8.3 g/dL    Albumin 4.0 3.5 - 5.2 g/dL    Bilirubin Total 0.4 <=1.2 mg/dL    GFR Estimate >90 >60 mL/min/1.73m2   CBC with Platelets & Differential    Narrative    The following orders were created for panel order CBC with Platelets & Differential.  Procedure                               Abnormality         Status                     ---------                               -----------         ------                     CBC with platelets and d...[[id number]]                      Final result                 Please view results for these tests on the individual orders.   CBC with platelets and differential   Result Value Ref Range    WBC Count 8.3 4.0 - 11.0 10e3/uL    RBC Count 4.96 4.40 - 5.90 10e6/uL     Hemoglobin 16.1 13.3 - 17.7 g/dL    Hematocrit 47.0 40.0 - 53.0 %    MCV 95 78 - 100 fL    MCH 32.5 26.5 - 33.0 pg    MCHC 34.3 31.5 - 36.5 g/dL    RDW 12.8 10.0 - 15.0 %    Platelet Count 272 150 - 450 10e3/uL    % Neutrophils 64 %    % Lymphocytes 25 %    % Monocytes 7 %    % Eosinophils 3 %    % Basophils 1 %    % Immature Granulocytes 0 %    NRBCs per 100 WBC 0 <1 /100    Absolute Neutrophils 5.3 1.6 - 8.3 10e3/uL    Absolute Lymphocytes 2.0 0.8 - 5.3 10e3/uL    Absolute Monocytes 0.6 0.0 - 1.3 10e3/uL    Absolute Eosinophils 0.2 0.0 - 0.7 10e3/uL    Absolute Basophils 0.1 0.0 - 0.2 10e3/uL    Absolute Immature Granulocytes 0.0 <=0.4 10e3/uL    Absolute NRBCs 0.0 10e3/uL   UA with Microscopic reflex to Culture - [place]    Specimen: Urine, Midstream   Result Value Ref Range    Color Urine Yellow Colorless, Straw, Light Yellow, Yellow    Appearance Urine Clear Clear    Glucose Urine Negative Negative mg/dL    Bilirubin Urine Negative Negative    Ketones Urine Negative Negative mg/dL    Specific Gravity Urine 1.020 1.003 - 1.035    Blood Urine Negative Negative    pH Urine 6.0 4.7 - 8.0    Protein Albumin Urine Negative Negative mg/dL    Urobilinogen Urine Normal Normal, 2.0 mg/dL    Nitrite Urine Negative Negative    Leukocyte Esterase Urine Negative Negative    Mucus Urine Present (A) None Seen /LPF    RBC Urine <1 <=2 /HPF    WBC Urine 5 <=5 /HPF    Squamous Epithelials Urine 0 <=1 /HPF    Hyaline Casts Urine 1 <=2 /LPF    Narrative    Urine Culture not indicated

## 2022-11-02 ENCOUNTER — HOSPITAL ENCOUNTER (OUTPATIENT)
Dept: CT IMAGING | Facility: HOSPITAL | Age: 52
Discharge: HOME | End: 2022-11-02
Attending: STUDENT IN AN ORGANIZED HEALTH CARE EDUCATION/TRAINING PROGRAM | Admitting: STUDENT IN AN ORGANIZED HEALTH CARE EDUCATION/TRAINING PROGRAM
Payer: COMMERCIAL

## 2022-11-02 ENCOUNTER — TELEPHONE (OUTPATIENT)
Dept: FAMILY MEDICINE | Facility: OTHER | Age: 52
End: 2022-11-02

## 2022-11-02 DIAGNOSIS — K57.30 DIVERTICULOSIS OF LARGE INTESTINE WITHOUT HEMORRHAGE: Chronic | ICD-10-CM

## 2022-11-02 DIAGNOSIS — K57.32 DIVERTICULITIS OF COLON: Primary | ICD-10-CM

## 2022-11-02 DIAGNOSIS — R10.32 ABDOMINAL PAIN, LEFT LOWER QUADRANT: ICD-10-CM

## 2022-11-02 PROCEDURE — 250N000011 HC RX IP 250 OP 636: Performed by: RADIOLOGY

## 2022-11-02 PROCEDURE — 74177 CT ABD & PELVIS W/CONTRAST: CPT

## 2022-11-02 RX ORDER — IOPAMIDOL 755 MG/ML
100 INJECTION, SOLUTION INTRAVASCULAR ONCE
Status: COMPLETED | OUTPATIENT
Start: 2022-11-02 | End: 2022-11-02

## 2022-11-02 RX ORDER — IOPAMIDOL 755 MG/ML
17 INJECTION, SOLUTION INTRAVASCULAR ONCE
Status: COMPLETED | OUTPATIENT
Start: 2022-11-02 | End: 2022-11-02

## 2022-11-02 RX ORDER — SULFAMETHOXAZOLE/TRIMETHOPRIM 800-160 MG
1 TABLET ORAL 2 TIMES DAILY
Qty: 20 TABLET | Refills: 0 | Status: SHIPPED | OUTPATIENT
Start: 2022-11-02 | End: 2022-11-12

## 2022-11-02 RX ADMIN — IOPAMIDOL 100 ML: 755 INJECTION, SOLUTION INTRAVENOUS at 08:03

## 2022-11-02 RX ADMIN — IOPAMIDOL 17 ML: 755 INJECTION, SOLUTION INTRAVENOUS at 08:02

## 2022-11-02 NOTE — TELEPHONE ENCOUNTER
Discussed diverticulitis with patient on CT scan.  Pain remains tolerable, maybe a little better from yesterday.  Tolerating oral intake. No fever. With duration of symptoms, plan to start Bactrim for 10 days.  Does have an appointment with general surgery tomorrow, to discuss colonoscopy.  Will need a colonoscopy 6 weeks after he is recovered.  Discussed signs or symptoms that would indicate need for urgent repeat evaluation and he was understanding.  Did also recommend a probiotic to take with the Bactrim for at least 2 weeks after.  will check in on in 2 to 3 days.    Sabrina Dill MD

## 2022-11-03 ENCOUNTER — PREP FOR PROCEDURE (OUTPATIENT)
Dept: SURGERY | Facility: OTHER | Age: 52
End: 2022-11-03

## 2022-11-03 ENCOUNTER — OFFICE VISIT (OUTPATIENT)
Dept: SURGERY | Facility: OTHER | Age: 52
End: 2022-11-03
Attending: PHYSICIAN ASSISTANT
Payer: COMMERCIAL

## 2022-11-03 VITALS
OXYGEN SATURATION: 97 % | BODY MASS INDEX: 27.3 KG/M2 | HEIGHT: 71 IN | SYSTOLIC BLOOD PRESSURE: 112 MMHG | HEART RATE: 78 BPM | WEIGHT: 195 LBS | DIASTOLIC BLOOD PRESSURE: 64 MMHG

## 2022-11-03 DIAGNOSIS — Z12.11 SPECIAL SCREENING FOR MALIGNANT NEOPLASMS, COLON: ICD-10-CM

## 2022-11-03 DIAGNOSIS — Z12.11 SCREENING FOR COLON CANCER: Primary | ICD-10-CM

## 2022-11-03 PROCEDURE — 99203 OFFICE O/P NEW LOW 30 MIN: CPT | Performed by: SURGERY

## 2022-11-03 ASSESSMENT — PAIN SCALES - GENERAL: PAINLEVEL: NO PAIN (1)

## 2022-11-03 NOTE — PATIENT INSTRUCTIONS
Thank you for allowing Dr. Gutierrez and our surgical team to participate in your care. Please call our health unit coordinator at [phone] with scheduling questions or the nurse at [phone] with any other questions or concerns.      You have been scheduled for Colonoscopy with  on 1/6/23.   You will use Gatorade bowel prep.  Please see handout for additional instruction.  You don't need a pre-operative appointment with your primary care provider.  You may call [phone] or [phone] with any questions.     COVID-19 test is needed prior to procedure, even if you've been vaccinated.   If you are going home on the same day as your procedure (surgery):    1-2 days before your procedure, take an at-home, rapid antigen test. You can buy these at many stores. If you can't find an at-home antigen test, please schedule a PCR test with [email or web address] by calling [phone], or visiting [email or web address]. We can't accept tests that are more than 4 days old.    If your test is NEGATIVE, please take a photo of the test. Show the photo to the nurse when you come in for your procedure (surgery)    If your test is POSITIVE, please contact the pre-Admission office at [phone]. If you are calling after 5 PM on weekdays, and on the weekend, please call [phone] and ask to speak with the House Supervisor.   If you are staying overnight in the hospital you will need to get a PCR covid test 2-4 days prior to procedure (surgery).

## 2022-11-03 NOTE — NURSING NOTE
"Chief Complaint   Patient presents with     Consult     Colonoscopy        Initial /64 (BP Location: Left arm, Cuff Size: Adult Large)   Pulse 78   Ht 1.803 m (5' 11\")   Wt 88.5 kg (195 lb)   SpO2 97%   BMI 27.20 kg/m   Estimated body mass index is 27.2 kg/m  as calculated from the following:    Height as of this encounter: 1.803 m (5' 11\").    Weight as of this encounter: 88.5 kg (195 lb).  Medication Reconciliation: complete  Alejandra Zavala LPN  "

## 2022-11-07 ENCOUNTER — TELEPHONE (OUTPATIENT)
Dept: FAMILY MEDICINE | Facility: OTHER | Age: 52
End: 2022-11-07

## 2022-11-08 NOTE — TELEPHONE ENCOUNTER
Called to check on with diverticulitis.   Reports he's doing well.   Eating/drinking fine  Not even noticing pain.   Changed some dietary habits.   Will monitor symptoms.

## 2022-11-08 NOTE — PROGRESS NOTES
"Surgery Consult Clinic Note      RE: Eric Melara  : 1970  MARTIN: 11/3/2022      Chief Complaint:  Colon cancer screening     History of Present Illness:  Eric Melara is a very pleasant 51 year old year old male who I am seeing at the request of Kristie Munson for evaluation of screening colon malignant neoplasm and consideration for colonoscopy.  He denies family history of colon or rectal cancer, blood in stool, changes in bowel habits, weight loss. Recently had CT confirmed diverticulitis. He is in no pain today.   Surgical hx: appendectomy.   He specifically denies fever, chills, nausea, vomiting, chest pain, shortness of breath or palpitations.      Medical history:  Past Medical History:   Diagnosis Date     NO ACTIVE PROBLEMS        Surgical history:  Past Surgical History:   Procedure Laterality Date     APPENDECTOMY         Family history:  Family History   Problem Relation Age of Onset     Pancreatic Cancer Father      Diabetes Maternal Grandmother      C.A.D. Other         mothers side       Medications:  Current Outpatient Medications   Medication Sig Dispense Refill     sulfamethoxazole-trimethoprim (BACTRIM DS) 800-160 MG tablet Take 1 tablet by mouth 2 times daily for 10 days 20 tablet 0     Allergies:  The patientis allergic to other [no clinical screening - see comments] and penicillins.  .  Social history:  Social History     Tobacco Use     Smoking status: Every Day     Packs/day: 1.00     Types: Cigarettes     Smokeless tobacco: Never     Tobacco comments:     Passive Exposure (NO)   Substance Use Topics     Alcohol use: Yes     Comment: Rarely     Marital status: .  Occupation: mines .    Review of Systems:  10 point review of systems was obtained and negative other than what previously mentioned in HPI    Physical Examination:  /64 (BP Location: Left arm, Cuff Size: Adult Large)   Pulse 78   Ht 1.803 m (5' 11\")   Wt 88.5 kg (195 lb)   SpO2 97%   BMI 27.20 " kg/m    General: AAOx4, NAD, WN/WD, ambulating without assistance  HEENT:NCAT, EOMI, PERRL Sclerae anicteric; Trachea mideline,   Chest:  No acute distress, CTAB    Cardiac:  regular rate and rhythm, no murmur   Abdomen: non-distended.   Extremities: Cursory exam unremarkable.  Skin: Warm, dry,   Neuro: no focal deficit,   Psych: happy, calm, asks appropriate questions      Assessment/Plan:  51 y.o. male with recent bout of uncomplicated diverticulitis, he is also due for colon cancer screening. Discussed waiting 6 weeks time to have colonoscopy. Discussed that quitting smoking would help with reduction of flares of diverticulitis.   Satisfactory candidate for colonoscopy.  The indications, risks, benefits and technical aspects of whole colon colonoscopy were outlined with risks including, but not limited to, perforation, bleeding and inability to visualize entire colon.  Management of each was reviewed.  The need of mechanical preparation of the colon was reviewed along with the use of monitored anesthetic care.  The patient's questions were asked and answered.      Griffin Gutierrez MD

## 2022-12-29 ENCOUNTER — ANESTHESIA EVENT (OUTPATIENT)
Dept: SURGERY | Facility: HOSPITAL | Age: 52
End: 2022-12-29
Payer: COMMERCIAL

## 2022-12-29 NOTE — ANESTHESIA PREPROCEDURE EVALUATION
Anesthesia Pre-Procedure Evaluation    Patient: Eric Melara   MRN: [id number] : 1970        Procedure : Procedure(s):  Colonoscopy,possible biopsy,possible polypectomy          Past Medical History:   Diagnosis Date     NO ACTIVE PROBLEMS       Past Surgical History:   Procedure Laterality Date     APPENDECTOMY        Allergies   Allergen Reactions     Other [No Clinical Screening - See Comments]      peas     Penicillins       Social History     Tobacco Use     Smoking status: Every Day     Packs/day: 1.00     Types: Cigarettes     Smokeless tobacco: Never     Tobacco comments:     Passive Exposure (NO)   Substance Use Topics     Alcohol use: Yes     Comment: Rarely      Wt Readings from Last 1 Encounters:   22 88.5 kg (195 lb)        Anesthesia Evaluation   Pt has had prior anesthetic. Type: General.    No history of anesthetic complications       ROS/MED HX  ENT/Pulmonary:     (+) ANNABELLE risk factors, snores loudly, tobacco use (started smoking age 21), Current use, 1 packs/day,     Neurologic:  - neg neurologic ROS     Cardiovascular:     (+) Dyslipidemia -----URBINA (was worked up 2021, SOB remains unchanged). Previous cardiac testing   Echo: Date: Results:    Stress Test: Date: 2021 Results:   The nuclear stress test is negative for inducible myocardial ischemia or infarction.    The left ventricular ejection fraction at rest is 65%. The left ventricular ejection fraction at stress is 59%.    There is no prior study for comparison.  ECG Reviewed: Date: Results:    Cath:  Date: Results:      METS/Exercise Tolerance: 4 - Raking leaves, gardening    Hematologic:  - neg hematologic  ROS     Musculoskeletal:  - neg musculoskeletal ROS     GI/Hepatic:     (+) bowel prep,     Renal/Genitourinary:  - neg Renal ROS     Endo:  - neg endo ROS     Psychiatric/Substance Use:  - neg psychiatric ROS     Infectious Disease:  - neg infectious disease ROS     Malignancy:  - neg malignancy ROS     Other:  -  neg other ROS          Physical Exam    Airway        Mallampati: II   TM distance: > 3 FB   Neck ROM: full   Mouth opening: > 3 cm    Respiratory Devices and Support         Dental  no notable dental history         Cardiovascular   cardiovascular exam normal       Rhythm and rate: regular and normal     Pulmonary   pulmonary exam normal        breath sounds clear to auscultation           OUTSIDE LABS:  CBC:   Lab Results   Component Value Date    WBC 8.3 11/01/2022    WBC 8.4 11/22/2021    HGB 16.1 11/01/2022    HGB 17.2 11/22/2021    HCT 47.0 11/01/2022    HCT 49.2 11/22/2021     11/01/2022     11/22/2021     BMP:   Lab Results   Component Value Date     11/01/2022     11/22/2021    POTASSIUM 4.2 11/01/2022    POTASSIUM 4.0 11/22/2021    CHLORIDE 104 11/01/2022    CHLORIDE 109 11/22/2021    CO2 25 11/01/2022    CO2 27 11/22/2021    BUN 11.3 11/01/2022    BUN 11 11/22/2021    CR 1.01 11/01/2022    CR 0.96 11/22/2021     (H) 11/01/2022    GLC 94 11/22/2021     COAGS: No results found for: PTT, INR, FIBR  POC: No results found for: BGM, HCG, HCGS  HEPATIC:   Lab Results   Component Value Date    ALBUMIN 4.0 11/01/2022    PROTTOTAL 7.4 11/01/2022    ALT 26 11/01/2022    AST 20 11/01/2022    ALKPHOS 55 11/01/2022    BILITOTAL 0.4 11/01/2022     OTHER:   Lab Results   Component Value Date    LACT 3.2 (H) 01/05/2015    DOUG 8.9 11/01/2022    LIPASE 178 01/05/2015    AMYLASE 35 05/15/2014    TSH 1.22 11/22/2021    CRP 60.65 (H) 11/01/2022       Anesthesia Plan    ASA Status:  3   NPO Status:  NPO Appropriate    Anesthesia Type: MAC.     - Reason for MAC: straight local not clinically adequate              Consents    Anesthesia Plan(s) and associated risks, benefits, and realistic alternatives discussed. Questions answered and patient/representative(s) expressed understanding.    - Discussed:     - Discussed with:  Patient         Postoperative Care            Comments:    Other Comments:  No hp   Jalil today  Risks and benefits of MAC anesthetic discussed including dental damage, aspiration, loss of airway, conversion to general anesthetic, CV complications, MI, stroke, death. Pt wishes to proceed.             LIZA Sainz CRNA

## 2023-01-06 ENCOUNTER — HOSPITAL ENCOUNTER (OUTPATIENT)
Facility: HOSPITAL | Age: 53
Discharge: HOME | End: 2023-01-06
Attending: SURGERY | Admitting: SURGERY
Payer: COMMERCIAL

## 2023-01-06 ENCOUNTER — ANESTHESIA (OUTPATIENT)
Dept: SURGERY | Facility: HOSPITAL | Age: 53
End: 2023-01-06
Payer: COMMERCIAL

## 2023-01-06 VITALS
HEIGHT: 71 IN | HEART RATE: 56 BPM | DIASTOLIC BLOOD PRESSURE: 72 MMHG | BODY MASS INDEX: 27.5 KG/M2 | SYSTOLIC BLOOD PRESSURE: 110 MMHG | TEMPERATURE: 97 F | OXYGEN SATURATION: 95 % | RESPIRATION RATE: 16 BRPM | WEIGHT: 196.4 LBS

## 2023-01-06 PROCEDURE — 999N000141 HC STATISTIC PRE-PROCEDURE NURSING ASSESSMENT: Performed by: SURGERY

## 2023-01-06 PROCEDURE — 360N000075 HC SURGERY LEVEL 2, PER MIN: Performed by: SURGERY

## 2023-01-06 PROCEDURE — 45385 COLONOSCOPY W/LESION REMOVAL: CPT | Performed by: NURSE ANESTHETIST, CERTIFIED REGISTERED

## 2023-01-06 PROCEDURE — 370N000017 HC ANESTHESIA TECHNICAL FEE, PER MIN: Performed by: SURGERY

## 2023-01-06 PROCEDURE — 250N000009 HC RX 250: Performed by: NURSE ANESTHETIST, CERTIFIED REGISTERED

## 2023-01-06 PROCEDURE — 710N000012 HC RECOVERY PHASE 2, PER MINUTE: Performed by: SURGERY

## 2023-01-06 PROCEDURE — 88305 TISSUE EXAM BY PATHOLOGIST: CPT | Mod: 26 | Performed by: PATHOLOGY

## 2023-01-06 PROCEDURE — 88305 TISSUE EXAM BY PATHOLOGIST: CPT | Mod: TC | Performed by: SURGERY

## 2023-01-06 PROCEDURE — 258N000003 HC RX IP 258 OP 636: Performed by: NURSE ANESTHETIST, CERTIFIED REGISTERED

## 2023-01-06 PROCEDURE — 250N000011 HC RX IP 250 OP 636: Performed by: NURSE ANESTHETIST, CERTIFIED REGISTERED

## 2023-01-06 PROCEDURE — 272N000001 HC OR GENERAL SUPPLY STERILE: Performed by: SURGERY

## 2023-01-06 PROCEDURE — 45385 COLONOSCOPY W/LESION REMOVAL: CPT | Mod: PT | Performed by: SURGERY

## 2023-01-06 RX ORDER — PROPOFOL 10 MG/ML
INJECTION, EMULSION INTRAVENOUS PRN
Status: DISCONTINUED | OUTPATIENT
Start: 2023-01-06 | End: 2023-01-06

## 2023-01-06 RX ORDER — NALOXONE HYDROCHLORIDE 0.4 MG/ML
0.2 INJECTION, SOLUTION INTRAMUSCULAR; INTRAVENOUS; SUBCUTANEOUS
Status: DISCONTINUED | OUTPATIENT
Start: 2023-01-06 | End: 2023-01-06 | Stop reason: HOSPADM

## 2023-01-06 RX ORDER — SODIUM CHLORIDE, SODIUM LACTATE, POTASSIUM CHLORIDE, CALCIUM CHLORIDE 600; 310; 30; 20 MG/100ML; MG/100ML; MG/100ML; MG/100ML
INJECTION, SOLUTION INTRAVENOUS CONTINUOUS
Status: DISCONTINUED | OUTPATIENT
Start: 2023-01-06 | End: 2023-01-06 | Stop reason: HOSPADM

## 2023-01-06 RX ORDER — NALOXONE HYDROCHLORIDE 0.4 MG/ML
0.4 INJECTION, SOLUTION INTRAMUSCULAR; INTRAVENOUS; SUBCUTANEOUS
Status: DISCONTINUED | OUTPATIENT
Start: 2023-01-06 | End: 2023-01-06 | Stop reason: HOSPADM

## 2023-01-06 RX ORDER — LIDOCAINE HYDROCHLORIDE 20 MG/ML
INJECTION, SOLUTION INFILTRATION; PERINEURAL PRN
Status: DISCONTINUED | OUTPATIENT
Start: 2023-01-06 | End: 2023-01-06

## 2023-01-06 RX ORDER — HYDRALAZINE HYDROCHLORIDE 20 MG/ML
2.5-5 INJECTION INTRAMUSCULAR; INTRAVENOUS EVERY 10 MIN PRN
Status: DISCONTINUED | OUTPATIENT
Start: 2023-01-06 | End: 2023-01-06 | Stop reason: HOSPADM

## 2023-01-06 RX ORDER — LIDOCAINE 40 MG/G
CREAM TOPICAL
Status: DISCONTINUED | OUTPATIENT
Start: 2023-01-06 | End: 2023-01-06 | Stop reason: HOSPADM

## 2023-01-06 RX ORDER — FLUMAZENIL 0.1 MG/ML
0.2 INJECTION, SOLUTION INTRAVENOUS
Status: DISCONTINUED | OUTPATIENT
Start: 2023-01-06 | End: 2023-01-06 | Stop reason: HOSPADM

## 2023-01-06 RX ORDER — ONDANSETRON 4 MG/1
4 TABLET, ORALLY DISINTEGRATING ORAL EVERY 30 MIN PRN
Status: DISCONTINUED | OUTPATIENT
Start: 2023-01-06 | End: 2023-01-06 | Stop reason: HOSPADM

## 2023-01-06 RX ORDER — ONDANSETRON 2 MG/ML
4 INJECTION INTRAMUSCULAR; INTRAVENOUS EVERY 30 MIN PRN
Status: DISCONTINUED | OUTPATIENT
Start: 2023-01-06 | End: 2023-01-06 | Stop reason: HOSPADM

## 2023-01-06 RX ADMIN — PROPOFOL 50 MG: 10 INJECTION, EMULSION INTRAVENOUS at 13:28

## 2023-01-06 RX ADMIN — PROPOFOL 50 MG: 10 INJECTION, EMULSION INTRAVENOUS at 13:01

## 2023-01-06 RX ADMIN — SODIUM CHLORIDE, POTASSIUM CHLORIDE, SODIUM LACTATE AND CALCIUM CHLORIDE: 600; 310; 30; 20 INJECTION, SOLUTION INTRAVENOUS at 12:43

## 2023-01-06 RX ADMIN — PROPOFOL 50 MG: 10 INJECTION, EMULSION INTRAVENOUS at 13:03

## 2023-01-06 RX ADMIN — PROPOFOL 30 MG: 10 INJECTION, EMULSION INTRAVENOUS at 13:21

## 2023-01-06 RX ADMIN — PROPOFOL 50 MG: 10 INJECTION, EMULSION INTRAVENOUS at 13:05

## 2023-01-06 RX ADMIN — PROPOFOL 30 MG: 10 INJECTION, EMULSION INTRAVENOUS at 13:14

## 2023-01-06 RX ADMIN — LIDOCAINE HYDROCHLORIDE 80 MG: 20 INJECTION, SOLUTION INFILTRATION; PERINEURAL at 13:01

## 2023-01-06 RX ADMIN — PROPOFOL 50 MG: 10 INJECTION, EMULSION INTRAVENOUS at 13:08

## 2023-01-06 ASSESSMENT — ACTIVITIES OF DAILY LIVING (ADL)
ADLS_ACUITY_SCORE: 35
ADLS_ACUITY_SCORE: 35

## 2023-01-06 ASSESSMENT — LIFESTYLE VARIABLES: TOBACCO_USE: 1

## 2023-01-06 NOTE — DISCHARGE INSTRUCTIONS

## 2023-01-06 NOTE — H&P
Surgery Consult Clinic Note      RE: Eric Mleara  : 1970        Chief Complaint:  Colon cancer screening    History of Present Illness:  Dr. Gutierrez originally saw Mr. Melara on 11/3/2022 for evaluation regarding screening colonoscopy.  Please refer to that note for further detail.  This is his first colonoscopy.  He has a recent history of uncomplicated diverticulitis.  He reports he has been having approximately 2 bouts of diverticulitis annually but they seem to be increasing in severity. He is here today to update his H&P.  He has no questions regarding  bowel prep.  Reports passing clear yellow liquid stools today.  He specifically denies fevers, chills, nausea, vomiting, chest pain, shortness of breath, palpitations, sore throat, cough, or generalized feeling ill.      Medical history:  Past Medical History:   Diagnosis Date     NO ACTIVE PROBLEMS        Surgical history:  Past Surgical History:   Procedure Laterality Date     APPENDECTOMY         Family history:  Family History   Problem Relation Age of Onset     Pancreatic Cancer Father      Diabetes Maternal Grandmother      C.A.D. Other         mothers side       Medications:  Prior to Admission medications    Not on File     Allergies:  The patient is allergic to other [no clinical screening - see comments] and penicillins.  .  Social history:  Social History     Tobacco Use     Smoking status: Every Day     Packs/day: 1.00     Types: Cigarettes     Smokeless tobacco: Never     Tobacco comments:     Passive Exposure (NO)   Substance Use Topics     Alcohol use: Yes     Comment: Rarely     Marital status: .      Review of Systems:    Constitutional: Negative for fever, chills and weight loss.   HENT: Negative for ear pain, nosebleeds, congestion, sore throat, tinnitus and ear discharge.    Eyes: Negative for blurred vision, double vision, photophobia and pain.   Respiratory: Negative for cough, hemoptysis, shortness of breath,  "wheezing and stridor.    Cardiovascular: Negative for chest pain, palpitations and orthopnea.   Gastrointestinal: Negative for heartburn, nausea, vomiting, abdominal pain and blood in stool.   Genitourinary: Negative for urgency, frequency and hematuria.   Musculoskeletal: Negative for myalgias, back pain and joint pain.   Neurological: Negative for tingling, speech change and headaches.   Endo/Heme/Allergies: Does not bruise/bleed easily.   Psychiatric/Behavioral: Negative for depression, suicidal ideas and hallucinations. The patient is not nervous/anxious.    Physical Examination:  /85   Pulse 62   Temp 98.4  F (36.9  C) (Tympanic)   Resp 18   Ht 1.803 m (5' 11\")   Wt 89.1 kg (196 lb 6.4 oz)   SpO2 96%   BMI 27.39 kg/m    General: Alert and orientedx4, no acute distress, well-developed/well-nourished, ambulating without assistance  HEENT: normocephalic atraumatic, extraocular movements intact, PERRL Sclerae anicteric; Trachea mideline, no JVD  Chest:   Clear to auscultation bilaterally.  Cardiac: S1S2 , regular rate and rhythm without additional sounds  Abdomen: Soft, non-tender, non-distended  Extremities: Cursory exam unremarkable.  No peripheral edema noted.  Skin: Warm, dry, less than 2 sec cap refill  Neuro: CN 2-12 grossly intact, no focal deficit, GCS 15  Psych: Pleasant, calm, asks appropriate questions      Assessment/Plan:  #1 Colonoscopy  #2 Personal history of diverticulitis    Eric Melara and I had a discussion about colonoscopies.  The indications, risks, benefits, althernatives and technical aspects of whole colon colonoscopy were outlined with risks including, but not limited to, perforation, bleeding and inability to visualize entire colon.  Management of each was reviewed including the risk for life saving surgery and possible admittance to the hospital.  The need of mechanical preparation of the colon was reviewed along with the use of monitored anesthetic care.  His " questions were asked and answered.  We will proceed with exam as scheduled.  Sherin Jimenes [facility]  [address] [place] [address]    [id number]    Referring Provider:  No referring provider defined for this encounter.     Primary Care Provider:  Kristie Munson

## 2023-01-06 NOTE — ANESTHESIA CARE TRANSFER NOTE
Patient: Eric Melara    Procedure: Procedure(s):  Colonoscopy with  polypectomy       Diagnosis: Screening for colon cancer [Z12.11]  Diagnosis Additional Information: No value filed.    Anesthesia Type:   MAC     Note:    Oropharynx: spontaneously breathing  Level of Consciousness: awake  Oxygen Supplementation: room air    Independent Airway: airway patency satisfactory and stable  Dentition: dentition unchanged  Vital Signs Stable: post-procedure vital signs reviewed and stable  Report to RN Given: handoff report given  Patient transferred to: Phase II    Handoff Report: Identifed the Patient, Identified the Reponsible Provider, Reviewed the pertinent medical history, Discussed the surgical course, Reviewed Intra-OP anesthesia mangement and issues during anesthesia, Set expectations for post-procedure period and Allowed opportunity for questions and acknowledgement of understanding      Vitals:  Vitals Value Taken Time   BP     Temp     Pulse     Resp     SpO2         Electronically Signed By: LIZA Tracey CRNA  January 6, 2023  1:37 PM

## 2023-01-06 NOTE — OR NURSING
Patient and responsible adult given discharge instructions with no questions regarding instructions. Elizabeth score 18/18. Pain level 0/10.  Discharged from unit via ambulation. Patient discharged to home with spouse.

## 2023-01-06 NOTE — OP NOTE
Eric Melara MRN# [id number]   YOB: 1970 Age: 52 year old      Date of Admission:  1/6/2023  Date of Service:   1/06/23    Primary care provider: Kristie Munson    PREOPERATIVE DIAGNOSIS:  Colon Cancer Screening        POSTOPERATIVE DIAGNOSIS:  Colon polyps x 4, sigmoid diverticulosis          PROCEDURE:  Colonoscopy with polypectomy          INDICATIONS:  Screening colonoscopy.      Specimen:   ID Type Source Tests Collected by Time Destination   1 : ascending colon polyp Polyp Large Intestine, Colon, Ascending SURGICAL PATHOLOGY EXAM Griffin Gutierrez MD 1/6/2023  1:17 PM    2 : Polyp 50 cm colon Polyp Large Intestine, Colon SURGICAL PATHOLOGY EXAM Griffin Gutierrez MD 1/6/2023  1:25 PM    3 : polyp 25 cm colon Polyp Large Intestine, Colon SURGICAL PATHOLOGY EXAM Griffin Gutierrez MD 1/6/2023  1:27 PM    4 : Rectal Polyp Polyp Rectum SURGICAL PATHOLOGY EXAM Griffin Gutierrez MD 1/6/2023  1:34 PM        SURGEON: Griffin Gutierrez MD    DESCRIPTION OF PROCEDURE: Eric Melara was brought into the endoscopy suite and placed in the left lateral decubitus position. After preprocedural pause and attended monitored anesthesia was administered, the external anus was inspected and was normal. Digital rectal exam was normal. The colonoscope was inserted and advanced under direct visualization to the level of the cecum which was identified by the appendiceal orifice and the ileocecal valve. The prep was excellent.. Upon slow withdrawal of the colonoscope, approximately 95% of the mucosa was directly visualized. There were sessile polyps in the above location removed with a cold snare. The one at 25 cm had an unusual texture and almost whitish color but this was removed in one piece. There was moderate sigmoid diverticulosis with no evidence of diverticulitis.  The rest of the colon was without mucosal abnormality. There was no evidence of further polyps, inflammation, bleeding or AVMs. Retroflexion of  the rectum was normal. The extra air was removed from the colon, and the colonoscope withdrawn. The patient tolerated the procedure well and was taken to postanesthesia care unit.     We invite the patient to return in 3-10 years for follow up screening evaluation, pending pathology related to polyps.    Griffin Gutierrez MD

## 2023-01-06 NOTE — ANESTHESIA POSTPROCEDURE EVALUATION
Patient: Eric Melara    Procedure: Procedure(s):  Colonoscopy with  polypectomy       Anesthesia Type:  MAC    Note:  Disposition: Outpatient   Postop Pain Control: Uneventful            Sign Out: Well controlled pain   PONV: No   Neuro/Psych: Uneventful            Sign Out: Acceptable/Baseline neuro status   Airway/Respiratory: Uneventful            Sign Out: Acceptable/Baseline resp. status   CV/Hemodynamics: Uneventful            Sign Out: Acceptable CV status; No obvious hypovolemia; No obvious fluid overload   Other NRE: NONE   DID A NON-ROUTINE EVENT OCCUR? No           Last vitals:  Vitals Value Taken Time   /72 01/06/23 1400   Temp 97  F (36.1  C) 01/06/23 1400   Pulse 56 01/06/23 1400   Resp 16 01/06/23 1400   SpO2 98 % 01/06/23 1402   Vitals shown include unvalidated device data.    Electronically Signed By: LIZA Tracey CRNA  January 6, 2023  2:27 PM

## 2023-01-11 LAB
PATH REPORT.COMMENTS IMP SPEC: NORMAL
PATH REPORT.FINAL DX SPEC: NORMAL
PATH REPORT.GROSS SPEC: NORMAL
PATH REPORT.MICROSCOPIC SPEC OTHER STN: NORMAL
PATH REPORT.RELEVANT HX SPEC: NORMAL
PHOTO IMAGE: NORMAL

## 2023-07-23 ENCOUNTER — HEALTH MAINTENANCE LETTER (OUTPATIENT)
Age: 53
End: 2023-07-23

## 2024-01-01 ENCOUNTER — MYC MEDICAL ADVICE (OUTPATIENT)
Dept: FAMILY MEDICINE | Facility: OTHER | Age: 54
End: 2024-01-01

## 2024-01-02 ENCOUNTER — MYC MEDICAL ADVICE (OUTPATIENT)
Dept: FAMILY MEDICINE | Facility: OTHER | Age: 54
End: 2024-01-02

## 2024-01-02 ENCOUNTER — TELEPHONE (OUTPATIENT)
Dept: FAMILY MEDICINE | Facility: OTHER | Age: 54
End: 2024-01-02

## 2024-01-02 DIAGNOSIS — R10.84 ABDOMINAL PAIN, GENERALIZED: ICD-10-CM

## 2024-01-02 DIAGNOSIS — Z87.19 HX OF DIVERTICULITIS OF COLON: Primary | ICD-10-CM

## 2024-01-02 NOTE — TELEPHONE ENCOUNTER
11:11 AM    Reason for Call: OVERBOOK    Patient is having the following symptoms: Diverticulitis flare  for  1 week / sent [employer] message to [employer] .    The patient is requesting an appointment for ASAP with Jeimy.    Was an appointment offered for this call? No  If yes : Appointment type              Date    Preferred method for responding to this message: Telephone Call  What is your phone number ?  [phone]     If we cannot reach you directly, may we leave a detailed response at the number you provided? Yes    Can this message wait until your PCP/provider returns, if unavailable today? Not applicable    Massiel Marino

## 2024-01-04 ENCOUNTER — MYC MEDICAL ADVICE (OUTPATIENT)
Dept: FAMILY MEDICINE | Facility: OTHER | Age: 54
End: 2024-01-04

## 2024-01-04 ENCOUNTER — OFFICE VISIT (OUTPATIENT)
Dept: FAMILY MEDICINE | Facility: OTHER | Age: 54
End: 2024-01-04
Attending: FAMILY MEDICINE
Payer: COMMERCIAL

## 2024-01-04 VITALS
BODY MASS INDEX: 28.77 KG/M2 | TEMPERATURE: 99.2 F | WEIGHT: 206.25 LBS | SYSTOLIC BLOOD PRESSURE: 124 MMHG | HEART RATE: 75 BPM | DIASTOLIC BLOOD PRESSURE: 86 MMHG | OXYGEN SATURATION: 95 %

## 2024-01-04 DIAGNOSIS — K57.32 DIVERTICULITIS OF COLON: Primary | ICD-10-CM

## 2024-01-04 PROCEDURE — 99213 OFFICE O/P EST LOW 20 MIN: CPT | Performed by: FAMILY MEDICINE

## 2024-01-04 RX ORDER — CIPROFLOXACIN 500 MG/1
500 TABLET, FILM COATED ORAL 2 TIMES DAILY
Qty: 14 TABLET | Refills: 0 | Status: SHIPPED | OUTPATIENT
Start: 2024-01-04 | End: 2024-02-29

## 2024-01-04 RX ORDER — METRONIDAZOLE 500 MG/1
500 TABLET ORAL 2 TIMES DAILY
Qty: 14 TABLET | Refills: 0 | Status: SHIPPED | OUTPATIENT
Start: 2024-01-04 | End: 2024-01-11

## 2024-01-04 ASSESSMENT — PAIN SCALES - GENERAL: PAINLEVEL: MILD PAIN (3)

## 2024-01-04 NOTE — PROGRESS NOTES
"0  Assessment & Plan     Diverticulitis of colon  Comment: Location of pain in LLQ along with similarity of symptoms a year ago point to Diverticulitis. No other Red flags or non - classic sx for diverticulitis. No labs or XR needed  Advised pt to take both metronidazole and ciprofloxacin twice a day until symptom improvement. Advised pt on diet and instructions on My Chart given . Advised pt to lower ibuprofen use and rely more on rest and ice and avoiding irritating foods. Follow-up if no symptoms improvement or worsening for further workup. Continue current medications and behavioral changes.   - metroNIDAZOLE (FLAGYL) 500 MG tablet; Take 1 tablet (500 mg) by mouth 2 times daily for 7 days  - ciprofloxacin (CIPRO) 500 MG tablet; Take 1 tablet (500 mg) by mouth 2 times daily             Nicotine/Tobacco Cessation:  He reports that he has been smoking cigarettes. He has been smoking an average of 1 pack per day. He has never used smokeless tobacco.  Nicotine/Tobacco Cessation Plan:         BMI:   Estimated body mass index is 28.77 kg/m  as calculated from the following:    Height as of 1/6/23: 1.803 m (5' 11\").    Weight as of this encounter: 93.6 kg (206 lb 4 oz).           No follow-ups on file.    Lam Costa MD  [facility] - BETHANY Reyes is a 53 year old, presenting for the following health issues:  Diverticulitis        1/4/2024     1:41 PM   Additional Questions   Roomed by Carmita Tan   Accompanied by None         1/4/2024     1:41 PM   Patient Reported Additional Medications   Patient reports taking the following new medications None       HPI     Concern - Diverticulitis   Onset: ongoing   Description: patient reports having abdominal pain mainly on the left side, and middle lower abdomen   Intensity: mild, moderate  Progression of Symptoms:  improving  Accompanying Signs & Symptoms: none   Previous history of similar problem: ongoing   Precipitating factors:       "  Worsened by: none   Alleviating factors:        Improved by: Ibuprofen   Therapies tried and outcome: Ibuprofen helps with the pain       Pt reported mild pain starting a week ago with no precipitating change or incident in LLQ and suprapubic area, especially while walking or moving. Has hx of hyperlipidemia. States symptoms are similar to incident of diverticulitis pain a year ago with symptoms being the same as now for which he was given antibiotics and symptoms resolved. Is currently heavily taking ibuprofen which helps with pain. Reported having a low fever. Has felt better today. Has had watery stool since symptoms which he attributes to taking milk of magnesia and magnesium citrate. Still smokes.    Review of Systems   Constitutional, HEENT, cardiovascular, pulmonary, gi and gu systems are negative, except as otherwise noted.      Objective    BP (!) 143/89 (BP Location: Left arm, Patient Position: Sitting, Cuff Size: Adult Large)   Pulse 75   Temp 99.2  F (37.3  C) (Tympanic)   Wt 93.6 kg (206 lb 4 oz)   SpO2 95%   BMI 28.77 kg/m    Body mass index is 28.77 kg/m .  Physical Exam   GENERAL: healthy, alert and no distress  RESP: lungs clear to auscultation - no rales, rhonchi or wheezes  CV: regular rate and rhythm, normal S1 S2, no S3 or S4, no murmur, click or rub, no peripheral edema and peripheral pulses strong  ABDOMEN: soft, tenderness/pain on palpitation of LLQ, no hepatosplenomegaly, no masses  MS: no gross musculoskeletal defects noted, no edema  SKIN: no suspicious lesions or rashes of abdomen

## 2024-01-15 ENCOUNTER — MYC MEDICAL ADVICE (OUTPATIENT)
Dept: FAMILY MEDICINE | Facility: OTHER | Age: 54
End: 2024-01-15

## 2024-01-15 NOTE — TELEPHONE ENCOUNTER
Future Appointments 1/15/2024 - 7/13/2024        Date Visit Type Length Department Provider     1/18/2024 10:45 AM SHORT 30 min [facility] Kristie Munson, [place]    Location Instructions:     From [place]: Take [address]. Turn left at [address]. Turn left at the first stoplight on [address]. At the first stop sign, take a right onto [address]. The upper level parking lot will be on the left. East Entrance Door number 10.   From [place]: Take [address]. Take a right at [address]. At the first stop sign, take a right onto [address]. The upper level parking lot will be on the left. East Entrance Door number 10.   From [place]: Take [address]. Take the [address] ramp towards [place]. Turn left onto [address]. Take a slight right onto [address]. Turn left at the first stoplight on [address]. At the first stop sign, take a right onto [address]. The upper level parking lot will be on the left. East Entrance Door number 10.                  See other my chart encounter as well.

## 2024-01-15 NOTE — TELEPHONE ENCOUNTER
1/15/2024 4:23 PM  Writer called pt regarding my chart message. Writer reviewed Dr. Costa's note. (Follow-up if no symptoms improvement or worsening for further workup.)    It appears Dr. Costa's nurse also sent a message to Kristie. Pt is scheduled to see PCP. However pt strongly encouraged if sx worsen and/or if he experiences moderate to severe pain and/or is needing immediate medical attention go to Emergency Room immediately. Pt verbalizes understanding and agrees to plan.    Future Appointments 1/15/2024 - 7/13/2024        Date Visit Type Length Department Provider     1/18/2024 10:45 AM SHORT 30 min [facility] Kristie Munson, [place]    Location Instructions:     From [facility]: Take [address]. Turn left at [address]. Turn left at the first stoplight on [address]. At the first stop sign, take a right onto [facility] [address] [facility]. The upper level parking lot will be on the left. East Entrance Door number 10.   From [place]: Take [address]. Take a right at [address]. At the first stop sign, take a right onto [address]. The upper level parking lot will be on the left. East Entrance Door number 10.   From [place]: Take [address]. Take the [address] ramp towards [place]. Turn left onto [address]. Take a slight right onto [address]. Turn left at the first stoplight on [address]. At the first stop sign, take a right onto [facility]. The upper level parking lot will be on the left. East Entrance Door number 10.

## 2024-01-18 ENCOUNTER — OFFICE VISIT (OUTPATIENT)
Dept: FAMILY MEDICINE | Facility: OTHER | Age: 54
End: 2024-01-18
Attending: PHYSICIAN ASSISTANT
Payer: COMMERCIAL

## 2024-01-18 ENCOUNTER — TELEPHONE (OUTPATIENT)
Dept: FAMILY MEDICINE | Facility: OTHER | Age: 54
End: 2024-01-18

## 2024-01-18 ENCOUNTER — MYC MEDICAL ADVICE (OUTPATIENT)
Dept: FAMILY MEDICINE | Facility: OTHER | Age: 54
End: 2024-01-18

## 2024-01-18 VITALS
OXYGEN SATURATION: 96 % | BODY MASS INDEX: 28.97 KG/M2 | HEIGHT: 71 IN | TEMPERATURE: 98.2 F | SYSTOLIC BLOOD PRESSURE: 112 MMHG | RESPIRATION RATE: 16 BRPM | HEART RATE: 69 BPM | WEIGHT: 206.9 LBS | DIASTOLIC BLOOD PRESSURE: 78 MMHG

## 2024-01-18 DIAGNOSIS — Z87.19 HX OF DIVERTICULITIS OF COLON: ICD-10-CM

## 2024-01-18 DIAGNOSIS — R10.84 ABDOMINAL PAIN, GENERALIZED: ICD-10-CM

## 2024-01-18 DIAGNOSIS — Z82.49 FAMILY HISTORY OF ISCHEMIC HEART DISEASE: ICD-10-CM

## 2024-01-18 DIAGNOSIS — R10.32 LLQ ABDOMINAL PAIN: ICD-10-CM

## 2024-01-18 DIAGNOSIS — F17.200 TOBACCO USE DISORDER: ICD-10-CM

## 2024-01-18 DIAGNOSIS — Z84.1 FAMILY HISTORY OF KIDNEY STONES: Primary | ICD-10-CM

## 2024-01-18 LAB
ALBUMIN SERPL BCG-MCNC: 4.3 G/DL (ref 3.5–5.2)
ALBUMIN UR-MCNC: NEGATIVE MG/DL
ALP SERPL-CCNC: 57 U/L (ref 40–150)
ALT SERPL W P-5'-P-CCNC: 66 U/L (ref 0–70)
ANION GAP SERPL CALCULATED.3IONS-SCNC: 9 MMOL/L (ref 7–15)
APPEARANCE UR: CLEAR
AST SERPL W P-5'-P-CCNC: 29 U/L (ref 0–45)
BASOPHILS # BLD AUTO: 0.1 10E3/UL (ref 0–0.2)
BASOPHILS NFR BLD AUTO: 1 %
BILIRUB SERPL-MCNC: 0.5 MG/DL
BILIRUB UR QL STRIP: NEGATIVE
BUN SERPL-MCNC: 15 MG/DL (ref 6–20)
CALCIUM SERPL-MCNC: 9.4 MG/DL (ref 8.6–10)
CHLORIDE SERPL-SCNC: 106 MMOL/L (ref 98–107)
COLOR UR AUTO: YELLOW
CREAT SERPL-MCNC: 0.96 MG/DL (ref 0.67–1.17)
CRP SERPL-MCNC: <3 MG/L
DEPRECATED HCO3 PLAS-SCNC: 25 MMOL/L (ref 22–29)
EGFRCR SERPLBLD CKD-EPI 2021: >90 ML/MIN/1.73M2
EOSINOPHIL # BLD AUTO: 0.3 10E3/UL (ref 0–0.7)
EOSINOPHIL NFR BLD AUTO: 4 %
ERYTHROCYTE [DISTWIDTH] IN BLOOD BY AUTOMATED COUNT: 12.5 % (ref 10–15)
ERYTHROCYTE [SEDIMENTATION RATE] IN BLOOD BY WESTERGREN METHOD: 6 MM/HR (ref 0–20)
GLUCOSE SERPL-MCNC: 88 MG/DL (ref 70–99)
GLUCOSE UR STRIP-MCNC: NEGATIVE MG/DL
HCT VFR BLD AUTO: 49.9 % (ref 40–53)
HGB BLD-MCNC: 17.5 G/DL (ref 13.3–17.7)
HGB UR QL STRIP: NEGATIVE
HYALINE CASTS: 1 /LPF
IMM GRANULOCYTES # BLD: 0 10E3/UL
IMM GRANULOCYTES NFR BLD: 0 %
KETONES UR STRIP-MCNC: NEGATIVE MG/DL
LEUKOCYTE ESTERASE UR QL STRIP: NEGATIVE
LYMPHOCYTES # BLD AUTO: 2 10E3/UL (ref 0.8–5.3)
LYMPHOCYTES NFR BLD AUTO: 31 %
MCH RBC QN AUTO: 32 PG (ref 26.5–33)
MCHC RBC AUTO-ENTMCNC: 35.1 G/DL (ref 31.5–36.5)
MCV RBC AUTO: 91 FL (ref 78–100)
MONOCYTES # BLD AUTO: 0.6 10E3/UL (ref 0–1.3)
MONOCYTES NFR BLD AUTO: 9 %
MUCOUS THREADS #/AREA URNS LPF: PRESENT /LPF
NEUTROPHILS # BLD AUTO: 3.6 10E3/UL (ref 1.6–8.3)
NEUTROPHILS NFR BLD AUTO: 55 %
NITRATE UR QL: NEGATIVE
NRBC # BLD AUTO: 0 10E3/UL
NRBC BLD AUTO-RTO: 0 /100
PH UR STRIP: 5.5 [PH] (ref 4.7–8)
PLATELET # BLD AUTO: 292 10E3/UL (ref 150–450)
POTASSIUM SERPL-SCNC: 4.3 MMOL/L (ref 3.4–5.3)
PROT SERPL-MCNC: 7.3 G/DL (ref 6.4–8.3)
RBC # BLD AUTO: 5.47 10E6/UL (ref 4.4–5.9)
RBC URINE: 1 /HPF
SODIUM SERPL-SCNC: 140 MMOL/L (ref 135–145)
SP GR UR STRIP: 1.02 (ref 1–1.03)
SQUAMOUS EPITHELIAL: 0 /HPF
UROBILINOGEN UR STRIP-MCNC: NORMAL MG/DL
WBC # BLD AUTO: 6.5 10E3/UL (ref 4–11)
WBC URINE: 2 /HPF

## 2024-01-18 PROCEDURE — 85025 COMPLETE CBC W/AUTO DIFF WBC: CPT | Performed by: PHYSICIAN ASSISTANT

## 2024-01-18 PROCEDURE — 81001 URINALYSIS AUTO W/SCOPE: CPT | Performed by: PHYSICIAN ASSISTANT

## 2024-01-18 PROCEDURE — 99214 OFFICE O/P EST MOD 30 MIN: CPT | Performed by: PHYSICIAN ASSISTANT

## 2024-01-18 PROCEDURE — 80053 COMPREHEN METABOLIC PANEL: CPT | Performed by: PHYSICIAN ASSISTANT

## 2024-01-18 PROCEDURE — 85652 RBC SED RATE AUTOMATED: CPT | Performed by: PHYSICIAN ASSISTANT

## 2024-01-18 PROCEDURE — 36415 COLL VENOUS BLD VENIPUNCTURE: CPT | Performed by: PHYSICIAN ASSISTANT

## 2024-01-18 PROCEDURE — 86140 C-REACTIVE PROTEIN: CPT | Performed by: PHYSICIAN ASSISTANT

## 2024-01-18 RX ORDER — MELATONIN/LEMON BALM LEAF EXTR 5MG-500MCG
1 TABLET ORAL EVERY 24 HOURS
Qty: 100 PATCH | Refills: 0 | Status: SHIPPED | OUTPATIENT
Start: 2024-01-18 | End: 2024-05-29

## 2024-01-18 ASSESSMENT — PAIN SCALES - GENERAL: PAINLEVEL: MILD PAIN (2)

## 2024-01-18 NOTE — TELEPHONE ENCOUNTER
1:22 PM    Reason for Call: Phone Call    Description: pts wife is calling the pt was seen today for his diverticulitis and his pcp mentioned that he could take another antibiotic to help and nothing has been sent to [facility] in [place]. He would like to try this antibiotic     Was an appointment offered for this call? No  If yes : Appointment type              Date    Preferred method for responding to this message: Telephone Call  What is your phone number ? [phone]    If we cannot reach you directly, may we leave a detailed response at the number you provided? Yes    Can this message wait until your PCP/provider returns, if available today? Not applicable     Zina Garibay

## 2024-01-18 NOTE — PROGRESS NOTES
"  Assessment & Plan     Family history of kidney stones  Has severe pain in the left lower abodmen.  Was placed on ABX and helped a little bit still in pain.  We will obtain a cT of hsi abdomen and elvix.   - Comprehensive metabolic panel (BMP + Alb, Alk Phos, ALT, AST, Total. Bili, TP); Future  - CT Abdomen Pelvis w/o & w Contrast; Future    Hx of diverticulitis of colon  CT done but pain not gone. Exam is he is mildly tender on lower left abdomen. We will get labs. Per results discussion on if we needed to treat further.  Sed rate was normal and CRP no need for further abx. But going to check his CT of his belly for stone.   - Comprehensive metabolic panel (BMP + Alb, Alk Phos, ALT, AST, Total. Bili, TP); Future  - CT Abdomen Pelvis w/o & w Contrast; Future  - CBC with platelets and differential  - CRP, inflammation  - ESR: Erythrocyte sedimentation rate  - Comprehensive metabolic panel (BMP + Alb, Alk Phos, ALT, AST, Total. Bili, TP)  - UA Macroscopic with reflex to Microscopic and Culture    LLQ abdominal pain    - UA Macroscopic with reflex to Microscopic and Culture; Future  - CBC with platelets and differential; Future  - ESR: Erythrocyte sedimentation rate; Future  - CRP, inflammation; Future  - Comprehensive metabolic panel (BMP + Alb, Alk Phos, ALT, AST, Total. Bili, TP); Future  - CT Abdomen Pelvis w/o & w Contrast; Future    Tobacco use disorder  Wanting to quit smoking discussion on options.   - nicotine (NICOTINE STEP 3) 7 MG/24HR 24 hr patch; Place 1 patch onto the skin every 24 hours    Family history of ischemic heart disease  Concern about his over all health. Just not feeling well. He has been feeling \"old\"  weak not as energetic as he had been. Family hx of early heart disease. Wanting to do this testing. Set a follow up in office.   - CT Coronary Calcium Scan; Future    Abdominal pain, generalized    - CBC with platelets and differential  - CRP, inflammation  - ESR: Erythrocyte sedimentation " "rate  - Comprehensive metabolic panel (BMP + Alb, Alk Phos, ALT, AST, Total. Bili, TP)  - UA Macroscopic with reflex to Microscopic and Culture    Review of external notes as documented elsewhere in note  Diagnosis or treatment significantly limited by social determinants of health -    Ordering of each unique test  15 minutes spent by me on the date of the encounter doing chart review, history and exam, documentation and further activities per the note      BMI  Estimated body mass index is 28.86 kg/m  as calculated from the following:    Height as of this encounter: 1.803 m (5' 11\").    Weight as of this encounter: 93.8 kg (206 lb 14.4 oz).       Regular exercise  See Patient Instructions    No follow-ups on file.    Ilan Reyes is a 53 year old, presenting for the following health issues:  diverticulitis        1/18/2024    10:58 AM   Additional Questions   Roomed by Ava Jane   Accompanied by self         1/18/2024    10:58 AM   Patient Reported Additional Medications   Patient reports taking the following new medications none     HPI     Abdominal Pain    Duration: A few weeks  Description (location/character/radiation): left lower abdominal quadrant        Associated flank pain: Mild  Intensity:  mild  Accompanying signs and symptoms:        Fever/Chills: no        Gas/Bloating: no        Nausea/vomitting: no        Diarrhea: YES- A little        Dysuria or Hematuria: no   History (previous similar pain/trauma/previous testing): yes  Precipitating or alleviating factors:       Pain worse with eating/BM/urination: no       Pain relieved by BM: Does not notice a change  Therapies tried and outcome: ibuprofen   LMP:  not applicable           Objective    /78   Pulse 69   Temp 98.2  F (36.8  C) (Tympanic)   Resp 16   Ht 1.803 m (5' 11\")   Wt 93.8 kg (206 lb 14.4 oz)   SpO2 96%   BMI 28.86 kg/m    Body mass index is 28.86 kg/m .    Review of Systems  Constitutional, neuro, ENT, endocrine, " pulmonary, cardiac, gastrointestinal, genitourinary, musculoskeletal, integument and psychiatric systems are negative, except as otherwise noted.  Physical Exam   GENERAL: alert and no distress  EYES: Eyes grossly normal to inspection, PERRL and conjunctivae and sclerae normal  HENT: ear canals and TM's normal, nose and mouth without ulcers or lesions  NECK: no adenopathy, no asymmetry, masses, or scars  RESP: lungs clear to auscultation - no rales, rhonchi or wheezes  CV: regular rate and rhythm, normal S1 S2, no S3 or S4, no murmur, click or rub, no peripheral edema  ABDOMEN: soft, nontender, no hepatosplenomegaly, no masses and bowel sounds normal  MS: no gross musculoskeletal defects noted, no edema  SKIN: no suspicious lesions or rashes  NEURO: Normal strength and tone, mentation intact and speech normal  PSYCH: mentation appears normal, affect normal/bright  LYMPH: no cervical, supraclavicular, axillary, or inguinal adenopathy    Admission on 01/06/2023, Discharged on 01/06/2023   Component Date Value Ref Range Status    Case Report 01/06/2023    Final                    Value:Surgical Pathology Report                         Case: [id number]                                  Authorizing Provider:  Griffin Gutierrez MD        Collected:           01/06/2023 01:17 PM          Ordering Location:     [facility]     Received:            01/09/2023 01:28 PM          Pathologist:           Rob Beal DO                                                         Specimens:   A) - Large Intestine, Colon, Ascending, ascending colon polyp                                       B) - Large Intestine, Colon, Polyp 50 cm colon                                                      C) - Large Intestine, Colon, polyp 25 cm colon                                                      D) - Rectum, Rectal Polyp                                                                  Final Diagnosis 01/06/2023    Final                     Value:This result contains rich text formatting which cannot be displayed here.    Clinical Information 01/06/2023    Final                    Value:This result contains rich text formatting which cannot be displayed here.    Gross Description 01/06/2023    Final                    Value:This result contains rich text formatting which cannot be displayed here.    Microscopic Description 01/06/2023    Final                    Value:This result contains rich text formatting which cannot be displayed here.     Results for orders placed or performed in visit on 01/18/24   CRP, inflammation     Status: Normal   Result Value Ref Range    CRP Inflammation <3.00 <5.00 mg/L   ESR: Erythrocyte sedimentation rate     Status: Normal   Result Value Ref Range    Erythrocyte Sedimentation Rate 6 0 - 20 mm/hr   Comprehensive metabolic panel (BMP + Alb, Alk Phos, ALT, AST, Total. Bili, TP)     Status: Normal   Result Value Ref Range    Sodium 140 135 - 145 mmol/L    Potassium 4.3 3.4 - 5.3 mmol/L    Carbon Dioxide (CO2) 25 22 - 29 mmol/L    Anion Gap 9 7 - 15 mmol/L    Urea Nitrogen 15.0 6.0 - 20.0 mg/dL    Creatinine 0.96 0.67 - 1.17 mg/dL    GFR Estimate >90 >60 mL/min/1.73m2    Calcium 9.4 8.6 - 10.0 mg/dL    Chloride 106 98 - 107 mmol/L    Glucose 88 70 - 99 mg/dL    Alkaline Phosphatase 57 40 - 150 U/L    AST 29 0 - 45 U/L    ALT 66 0 - 70 U/L    Protein Total 7.3 6.4 - 8.3 g/dL    Albumin 4.3 3.5 - 5.2 g/dL    Bilirubin Total 0.5 <=1.2 mg/dL   UA Macroscopic with reflex to Microscopic and Culture     Status: Abnormal    Specimen: Urine, Midstream   Result Value Ref Range    Color Urine Yellow Colorless, Straw, Light Yellow, Yellow    Appearance Urine Clear Clear    Glucose Urine Negative Negative mg/dL    Bilirubin Urine Negative Negative    Ketones Urine Negative Negative mg/dL    Specific Gravity Urine 1.025 1.003 - 1.035    Blood Urine Negative Negative    pH Urine 5.5 4.7 - 8.0    Protein Albumin Urine Negative  Negative mg/dL    Urobilinogen Urine Normal Normal, 2.0 mg/dL    Nitrite Urine Negative Negative    Leukocyte Esterase Urine Negative Negative    Mucus Urine Present (A) None Seen /LPF    RBC Urine 1 <=2 /HPF    WBC Urine 2 <=5 /HPF    Squamous Epithelials Urine 0 <=1 /HPF    Hyaline Casts Urine 1 <=2 /LPF    Narrative    Microscopic not indicated  Urine Culture not indicated   CBC with platelets and differential     Status: None   Result Value Ref Range    WBC Count 6.5 4.0 - 11.0 10e3/uL    RBC Count 5.47 4.40 - 5.90 10e6/uL    Hemoglobin 17.5 13.3 - 17.7 g/dL    Hematocrit 49.9 40.0 - 53.0 %    MCV 91 78 - 100 fL    MCH 32.0 26.5 - 33.0 pg    MCHC 35.1 31.5 - 36.5 g/dL    RDW 12.5 10.0 - 15.0 %    Platelet Count 292 150 - 450 10e3/uL    % Neutrophils 55 %    % Lymphocytes 31 %    % Monocytes 9 %    % Eosinophils 4 %    % Basophils 1 %    % Immature Granulocytes 0 %    NRBCs per 100 WBC 0 <1 /100    Absolute Neutrophils 3.6 1.6 - 8.3 10e3/uL    Absolute Lymphocytes 2.0 0.8 - 5.3 10e3/uL    Absolute Monocytes 0.6 0.0 - 1.3 10e3/uL    Absolute Eosinophils 0.3 0.0 - 0.7 10e3/uL    Absolute Basophils 0.1 0.0 - 0.2 10e3/uL    Absolute Immature Granulocytes 0.0 <=0.4 10e3/uL    Absolute NRBCs 0.0 10e3/uL   CBC with platelets and differential     Status: None    Narrative    The following orders were created for panel order CBC with platelets and differential.  Procedure                               Abnormality         Status                     ---------                               -----------         ------                     CBC with platelets and d...[[id number]]                      Final result                 Please view results for these tests on the individual orders.           Signed Electronically by: RUSLAN Beth

## 2024-01-19 NOTE — TELEPHONE ENCOUNTER
2nd message was left for patient to return call.   Your labs indicate it is resolved.  Sometimes take a little time for the pain to go away.

## 2024-01-24 ENCOUNTER — HOSPITAL ENCOUNTER (OUTPATIENT)
Dept: CT IMAGING | Facility: HOSPITAL | Age: 54
Discharge: HOME | End: 2024-01-24
Attending: PHYSICIAN ASSISTANT
Payer: COMMERCIAL

## 2024-01-24 DIAGNOSIS — Z87.19 HX OF DIVERTICULITIS OF COLON: ICD-10-CM

## 2024-01-24 DIAGNOSIS — R10.32 LLQ ABDOMINAL PAIN: ICD-10-CM

## 2024-01-24 DIAGNOSIS — Z84.1 FAMILY HISTORY OF KIDNEY STONES: ICD-10-CM

## 2024-01-24 DIAGNOSIS — Z82.49 FAMILY HISTORY OF ISCHEMIC HEART DISEASE: ICD-10-CM

## 2024-01-24 PROCEDURE — 75571 CT HRT W/O DYE W/CA TEST: CPT

## 2024-01-24 PROCEDURE — 250N000011 HC RX IP 250 OP 636: Performed by: RADIOLOGY

## 2024-01-24 PROCEDURE — 74178 CT ABD&PLV WO CNTR FLWD CNTR: CPT

## 2024-01-24 RX ORDER — IOPAMIDOL 755 MG/ML
101 INJECTION, SOLUTION INTRAVASCULAR ONCE
Status: COMPLETED | OUTPATIENT
Start: 2024-01-24 | End: 2024-01-24

## 2024-01-24 RX ADMIN — IOPAMIDOL 101 ML: 755 INJECTION, SOLUTION INTRAVENOUS at 15:55

## 2024-01-24 NOTE — LETTER
2024      Eric ECHEVERRIA Melara  [address] [facility] [place] [address]        Dear ,    We are writing to inform you of your test results.    You are not diabetic.  Showing some calcium in his Left Anterior Artery, no cardiac symptoms. Only shows not feeling as strong as you were in the past. Low score.     Resulted Orders   CT Coronary Calcium Scan    Narrative    PROCEDURE:  CT CALCIUM SCREENING, 2024 3:34 PM    HISTORY:53 years Male   faily hx of early coronary disease on his  mothers side.  and has generally been feeling not as strong and  faigues more easily.  genral health he feels is not as good as it  was.; Family history of ischemic heart disease    COMPARISON:  None.    FINDINGS:    Examination quality: Satisfactory.    This exam was performed using one or more of the following dose  reduction techniques:  Automated exposure control, adjustment of the NICKY and/or KV according  to patient's size, and/or use of iterative reconstruction technique.    CORONARY CALCIUM SCORE:    Left Main:                            0  Left anterior descendin.1  Left Circumflex:                  0  Right coronary artery:        0    TOTAL:                               2.1    The estimated probability of a non-zero calcium score for a Male of  age 52 years is 47%. The observed calcium score of by 1 is at 54  percentile for subjects of the same age, gender, and race/ethnicity  who are free of clinical cardiovascular disease and treated diabetes.      Nonvascular findings  Pleural spaces:  Unremarkable  Lungs: The visualized lungs are clear  Mediastinum:  There is no evidence of mass or lymphadenopathy.  Chest wall:  Unremarkable        Impression    IMPRESSION:     The observed calcium score of 2.1 is at 54 percentile for subjects of  the same age, gender, and race/ethnicity who are free of clinical  cardiovascular disease and treated diabetes.      ERMA TRACEY MD         SYSTEM ID:  [id number]        If you have any questions or concerns, please call the clinic at the number listed above.       Sincerely,      RUSLAN Marquez

## 2024-01-29 NOTE — RESULT ENCOUNTER NOTE
Pt is not diabetic.  He is showing some calcium in his Left Anterior Artery.  Has no cardiac symptoms. Only shows he feels not as strong as he was in the past. Low score

## 2024-02-09 ENCOUNTER — TELEPHONE (OUTPATIENT)
Dept: FAMILY MEDICINE | Facility: OTHER | Age: 54
End: 2024-02-09

## 2024-02-09 NOTE — TELEPHONE ENCOUNTER
Results requested: calling ct results     Best number to reach patient at: [phone]     Best time to call patient: asap    Send to care team in basket.

## 2024-02-28 NOTE — PROGRESS NOTES
Assessment & Plan     Headache associated with orgasm  Severe headache where he nearly passed out.  He had to lay down and could not get up at all. Lasted over 10 minutes and had lingering until he woke up the next day .  He doen't have headaches that severe ever. States was the worst headache of his life.   - MR Brain w/o & w Contrast; Future    Mixed hyperlipidemia  He is given labs today only had coffee. Last check LDL was 179 in 2021.  Now that his calcium score is up a little bit he will be managing his risk factor.   - Lipid Profile (Chol, Trig, HDL, LDL calc); Future    Elevated coronary artery calcium score  As above. Blood pressure is perfect today.   - Lipid Profile (Chol, Trig, HDL, LDL calc); Future    Review of external notes as documented elsewhere in note  Ordering of each unique test  Prescription drug management  10 minutes spent by me on the date of the encounter doing chart review, history and exam, documentation and further activities per the note        See Patient Instructions    No follow-ups on file.    Subjective   Eric is a 53 year old, presenting for the following health issues:  Follow Up        2/29/2024    10:34 AM   Additional Questions   Roomed by Ava Jane   Accompanied by self         2/29/2024    10:34 AM   Patient Reported Additional Medications   Patient reports taking the following new medications none     HPI     *Discuss CT results    Hyperlipidemia Follow-Up    Are you regularly taking any medication or supplement to lower your cholesterol?   No  Are you having muscle aches or other side effects that you think could be caused by your cholesterol lowering medication?  No  Nicotine/Tobacco Cessation  Eric JACKI Melara presents to discuss nicotine/tobacco cessation.    Tobacco Use    Smoking status: Former     Packs/day: 1     Types: Cigarettes     Quit date: 2/26/2024    Smokeless tobacco: Never    Tobacco comments:     Passive Exposure (NO)     In the process of  "quitting 2/29/24   Vaping Use    Vaping Use: Never used   Substance and Sexual Activity    Alcohol use: Yes     Comment: Rarely    Drug use: No    Sexual activity: Yes         1/18/2024    10:35 AM   Smoking Cessation - Willing To Make Quit Attempt   If I could quit smoking, I would.  5   I want to quit smoking because I worry about how smoking affects my health.   4   I would be willing to make a plan to quit smoking.  4   I would be willing to cut down my number of cigarettes before quiting.  4   Total Score 17     Prior treatments tried: other right now cold turkey         Review of Systems  Constitutional, HEENT, cardiovascular, pulmonary, gi and gu systems are negative, except as otherwise noted.      Objective    /78   Pulse 63   Temp 97.5  F (36.4  C) (Tympanic)   Resp 15   Ht 1.803 m (5' 11\")   Wt 95.4 kg (210 lb 6.4 oz)   SpO2 97%   BMI 29.34 kg/m    Body mass index is 29.34 kg/m .  Physical Exam   GENERAL: alert and no distress  NECK: no adenopathy, no asymmetry, masses, or scars  RESP: lungs clear to auscultation - no rales, rhonchi or wheezes  CV: regular rate and rhythm, normal S1 S2, no S3 or S4, no murmur, click or rub, no peripheral edema  ABDOMEN: soft, nontender, no hepatosplenomegaly, no masses and bowel sounds normal  MS: no gross musculoskeletal defects noted, no edema  SKIN: no suspicious lesions or rashes  NEURO: Normal strength and tone, mentation intact and speech normal  PSYCH: mentation appears normal, affect normal/bright    Office Visit on 01/18/2024   Component Date Value Ref Range Status    CRP Inflammation 01/18/2024 <3.00  <5.00 mg/L Final    Erythrocyte Sedimentation Rate 01/18/2024 6  0 - 20 mm/hr Final    Sodium 01/18/2024 140  135 - 145 mmol/L Final    Reference intervals for this test were updated on 09/26/2023 to more accurately reflect our healthy population. There may be differences in the flagging of prior results with similar values performed with this method. " Interpretation of those prior results can be made in the context of the updated reference intervals.     Potassium 01/18/2024 4.3  3.4 - 5.3 mmol/L Final    Carbon Dioxide (CO2) 01/18/2024 25  22 - 29 mmol/L Final    Anion Gap 01/18/2024 9  7 - 15 mmol/L Final    Urea Nitrogen 01/18/2024 15.0  6.0 - 20.0 mg/dL Final    Creatinine 01/18/2024 0.96  0.67 - 1.17 mg/dL Final    GFR Estimate 01/18/2024 >90  >60 mL/min/1.73m2 Final    Calcium 01/18/2024 9.4  8.6 - 10.0 mg/dL Final    Chloride 01/18/2024 106  98 - 107 mmol/L Final    Glucose 01/18/2024 88  70 - 99 mg/dL Final    Alkaline Phosphatase 01/18/2024 57  40 - 150 U/L Final    Reference intervals for this test were updated on 11/14/2023 to more accurately reflect our healthy population. There may be differences in the flagging of prior results with similar values performed with this method. Interpretation of those prior results can be made in the context of the updated reference intervals.    AST 01/18/2024 29  0 - 45 U/L Final    Reference intervals for this test were updated on 6/12/2023 to more accurately reflect our healthy population. There may be differences in the flagging of prior results with similar values performed with this method. Interpretation of those prior results can be made in the context of the updated reference intervals.    ALT 01/18/2024 66  0 - 70 U/L Final    Reference intervals for this test were updated on 6/12/2023 to more accurately reflect our healthy population. There may be differences in the flagging of prior results with similar values performed with this method. Interpretation of those prior results can be made in the context of the updated reference intervals.      Protein Total 01/18/2024 7.3  6.4 - 8.3 g/dL Final    Albumin 01/18/2024 4.3  3.5 - 5.2 g/dL Final    Bilirubin Total 01/18/2024 0.5  <=1.2 mg/dL Final    Color Urine 01/18/2024 Yellow  Colorless, Straw, Light Yellow, Yellow Final    Appearance Urine 01/18/2024 Clear   Clear Final    Glucose Urine 01/18/2024 Negative  Negative mg/dL Final    Bilirubin Urine 01/18/2024 Negative  Negative Final    Ketones Urine 01/18/2024 Negative  Negative mg/dL Final    Specific Gravity Urine 01/18/2024 1.025  1.003 - 1.035 Final    Blood Urine 01/18/2024 Negative  Negative Final    pH Urine 01/18/2024 5.5  4.7 - 8.0 Final    Protein Albumin Urine 01/18/2024 Negative  Negative mg/dL Final    Urobilinogen Urine 01/18/2024 Normal  Normal, 2.0 mg/dL Final    Nitrite Urine 01/18/2024 Negative  Negative Final    Leukocyte Esterase Urine 01/18/2024 Negative  Negative Final    Mucus Urine 01/18/2024 Present (A)  None Seen /LPF Final    RBC Urine 01/18/2024 1  <=2 /HPF Final    WBC Urine 01/18/2024 2  <=5 /HPF Final    Squamous Epithelials Urine 01/18/2024 0  <=1 /HPF Final    Hyaline Casts Urine 01/18/2024 1  <=2 /LPF Final    WBC Count 01/18/2024 6.5  4.0 - 11.0 10e3/uL Final    RBC Count 01/18/2024 5.47  4.40 - 5.90 10e6/uL Final    Hemoglobin 01/18/2024 17.5  13.3 - 17.7 g/dL Final    Hematocrit 01/18/2024 49.9  40.0 - 53.0 % Final    MCV 01/18/2024 91  78 - 100 fL Final    MCH 01/18/2024 32.0  26.5 - 33.0 pg Final    MCHC 01/18/2024 35.1  31.5 - 36.5 g/dL Final    RDW 01/18/2024 12.5  10.0 - 15.0 % Final    Platelet Count 01/18/2024 292  150 - 450 10e3/uL Final    % Neutrophils 01/18/2024 55  % Final    % Lymphocytes 01/18/2024 31  % Final    % Monocytes 01/18/2024 9  % Final    % Eosinophils 01/18/2024 4  % Final    % Basophils 01/18/2024 1  % Final    % Immature Granulocytes 01/18/2024 0  % Final    NRBCs per 100 WBC 01/18/2024 0  <1 /100 Final    Absolute Neutrophils 01/18/2024 3.6  1.6 - 8.3 10e3/uL Final    Absolute Lymphocytes 01/18/2024 2.0  0.8 - 5.3 10e3/uL Final    Absolute Monocytes 01/18/2024 0.6  0.0 - 1.3 10e3/uL Final    Absolute Eosinophils 01/18/2024 0.3  0.0 - 0.7 10e3/uL Final    Absolute Basophils 01/18/2024 0.1  0.0 - 0.2 10e3/uL Final    Absolute Immature Granulocytes 01/18/2024  0.0  <=0.4 10e3/uL Final    Absolute NRBCs 01/18/2024 0.0  10e3/uL Final           Signed Electronically by: Kristie Munson PA

## 2024-02-29 ENCOUNTER — OFFICE VISIT (OUTPATIENT)
Dept: FAMILY MEDICINE | Facility: OTHER | Age: 54
End: 2024-02-29
Attending: PHYSICIAN ASSISTANT
Payer: COMMERCIAL

## 2024-02-29 VITALS
BODY MASS INDEX: 29.46 KG/M2 | HEIGHT: 71 IN | OXYGEN SATURATION: 97 % | TEMPERATURE: 97.5 F | RESPIRATION RATE: 15 BRPM | HEART RATE: 63 BPM | SYSTOLIC BLOOD PRESSURE: 118 MMHG | DIASTOLIC BLOOD PRESSURE: 78 MMHG | WEIGHT: 210.4 LBS

## 2024-02-29 DIAGNOSIS — G44.82 HEADACHE ASSOCIATED WITH ORGASM: Primary | ICD-10-CM

## 2024-02-29 DIAGNOSIS — M76.62 ACHILLES TENDONITIS, BILATERAL: ICD-10-CM

## 2024-02-29 DIAGNOSIS — R93.1 ELEVATED CORONARY ARTERY CALCIUM SCORE: ICD-10-CM

## 2024-02-29 DIAGNOSIS — M76.61 ACHILLES TENDONITIS, BILATERAL: ICD-10-CM

## 2024-02-29 DIAGNOSIS — E78.2 MIXED HYPERLIPIDEMIA: ICD-10-CM

## 2024-02-29 LAB
CHOLEST SERPL-MCNC: 235 MG/DL
FASTING STATUS PATIENT QL REPORTED: YES
HDLC SERPL-MCNC: 48 MG/DL
LDLC SERPL CALC-MCNC: 152 MG/DL
NONHDLC SERPL-MCNC: 187 MG/DL
TRIGL SERPL-MCNC: 177 MG/DL

## 2024-02-29 PROCEDURE — 99213 OFFICE O/P EST LOW 20 MIN: CPT | Mod: 25 | Performed by: PHYSICIAN ASSISTANT

## 2024-02-29 PROCEDURE — 90472 IMMUNIZATION ADMIN EACH ADD: CPT | Performed by: PHYSICIAN ASSISTANT

## 2024-02-29 PROCEDURE — 90715 TDAP VACCINE 7 YRS/> IM: CPT | Performed by: PHYSICIAN ASSISTANT

## 2024-02-29 PROCEDURE — 90677 PCV20 VACCINE IM: CPT | Performed by: PHYSICIAN ASSISTANT

## 2024-02-29 PROCEDURE — 36415 COLL VENOUS BLD VENIPUNCTURE: CPT | Performed by: PHYSICIAN ASSISTANT

## 2024-02-29 PROCEDURE — 80061 LIPID PANEL: CPT | Performed by: PHYSICIAN ASSISTANT

## 2024-02-29 PROCEDURE — 90471 IMMUNIZATION ADMIN: CPT | Performed by: PHYSICIAN ASSISTANT

## 2024-02-29 RX ORDER — ROSUVASTATIN CALCIUM 10 MG/1
10 TABLET, COATED ORAL DAILY
Qty: 90 TABLET | Refills: 1 | Status: SHIPPED | OUTPATIENT
Start: 2024-02-29 | End: 2024-08-16

## 2024-03-11 ENCOUNTER — OFFICE VISIT (OUTPATIENT)
Dept: PODIATRY | Facility: OTHER | Age: 54
End: 2024-03-11
Attending: PODIATRIST
Payer: COMMERCIAL

## 2024-03-11 VITALS
HEART RATE: 65 BPM | TEMPERATURE: 97.3 F | SYSTOLIC BLOOD PRESSURE: 133 MMHG | DIASTOLIC BLOOD PRESSURE: 86 MMHG | OXYGEN SATURATION: 96 %

## 2024-03-11 DIAGNOSIS — M77.51 RIGHT CALCANEAL BURSITIS: ICD-10-CM

## 2024-03-11 DIAGNOSIS — M62.461 GASTROCNEMIUS EQUINUS OF RIGHT LOWER EXTREMITY: Primary | ICD-10-CM

## 2024-03-11 DIAGNOSIS — G57.92 NEURITIS OF LEFT FOOT: ICD-10-CM

## 2024-03-11 DIAGNOSIS — M62.462 GASTROCNEMIUS EQUINUS OF LEFT LOWER EXTREMITY: ICD-10-CM

## 2024-03-11 DIAGNOSIS — M77.52 BURSITIS OF HEEL, LEFT: ICD-10-CM

## 2024-03-11 DIAGNOSIS — G57.91 NEURITIS OF RIGHT FOOT: ICD-10-CM

## 2024-03-11 PROCEDURE — 99203 OFFICE O/P NEW LOW 30 MIN: CPT | Performed by: PODIATRIST

## 2024-03-11 ASSESSMENT — PAIN SCALES - GENERAL: PAINLEVEL: MILD PAIN (2)

## 2024-03-11 NOTE — PATIENT INSTRUCTIONS
-Stability Shoe Gear: This involves wearing a solid tennis shoe that bends at the toe, but has a solid midfoot portion of the shoe that does not bend or twist in half, and a rigid heel contour.   -----Sullivan, Asics, and New Balance are a few brands that have several types of stability tennis shoes. However, these brands also carry lightweight shoes that do not meet the above criteria, so look for a stability tennis shoe.  -----Sullivan tend to have a wider toe box if you have difficulty finding wide enough shoes for your feet.   -----Any brand of can be worn as long as it meets the above three criteria.  -Compression socks: Advised to wear compression socks all day (especially when working) to decrease LOWER EXTREMITY swelling in both feet and legs. Apply the socks first thing in the morning before getting out of bed and remove them at night when the feet are elevated in bed. Look for 15-30mmHg (the amount of compression).  -Stretching: Stretch the calf muscles to increase flexibility of the calf muscles. If possible, aim to stretch the calf muscles for a combined total of one hour per day.  -Icing: Ice the painful area of the foot minimally once a day for ten minutes per foot (can be a frozen water bottle if pain is on the bottom surface of the foot). Ice after any extended amount of time on your feet.  -Consider supportive sandals for around the house (such as [place], Vionics, Birkenstocks, Keens, Merrells, or Oofos sandals)    -Custom inserts will be ordered today -- you will be called to make an appointment

## 2024-03-11 NOTE — PROGRESS NOTES
Chief complaint: Patient presents with:  Musculoskeletal Problem: Bilateral achilles tendonitis       History of Present Illness: This 53 year old male is seen at the request of No ref. provider found for evaluation and suggestions of management of bilateral foot plantar fasciitis pain. Patient says it has been off and on for years. Resting improves the pain, but first step pain causes pain in the bilateral heel and ankle. The pain is more in the rim of the heel. He has not tried icing and stretching. He is looking for treatment options. The longer he is on the feet, the more pain he develops.    No further pedal complaints today.       /86 (BP Location: Left arm, Patient Position: Sitting, Cuff Size: Adult Regular)   Pulse 65   Temp 97.3  F (36.3  C) (Tympanic)   SpO2 96%     Patient Active Problem List   Diagnosis    Mixed hyperlipidemia    URBINA (dyspnea on exertion)    Diverticulosis of large intestine without hemorrhage    Elevated coronary artery calcium score    Headache associated with orgasm       Past Surgical History:   Procedure Laterality Date    APPENDECTOMY      COLONOSCOPY N/A 1/6/2023    Procedure: Colonoscopy with  polypectomy;  Surgeon: Griffin Gutierrez MD;  Location: [facility]       Current Outpatient Medications   Medication    rosuvastatin (CRESTOR) 10 MG tablet    nicotine (NICOTINE STEP 3) 7 MG/24HR 24 hr patch     No current facility-administered medications for this visit.          Allergies   Allergen Reactions    Other [No Clinical Screening - See Comments]      peas    Penicillins        Family History   Problem Relation Age of Onset    Pancreatic Cancer Father     Diabetes Maternal Grandmother     C.A.D. Other         mothers side       Social History     Socioeconomic History    Marital status:      Spouse name: None    Number of children: None    Years of education: None    Highest education level: None   Tobacco Use    Smoking status: Former     Packs/day: 1     Types:  Cigarettes     Quit date: 2024     Years since quittin.0    Smokeless tobacco: Never    Tobacco comments:     Passive Exposure (NO)     In the process of quitting 24   Vaping Use    Vaping Use: Never used   Substance and Sexual Activity    Alcohol use: Yes     Comment: Rarely    Drug use: No    Sexual activity: Yes   Other Topics Concern    Caffeine Concern Yes     Comment: Coffee     Social Determinants of Health     Financial Resource Strain: Low Risk  (2024)    Financial Resource Strain     Within the past 12 months, have you or your family members you live with been unable to get utilities (heat, electricity) when it was really needed?: No   Food Insecurity: Low Risk  (2024)    Food Insecurity     Within the past 12 months, did you worry that your food would run out before you got money to buy more?: No     Within the past 12 months, did the food you bought just not last and you didn t have money to get more?: No   Transportation Needs: Low Risk  (2024)    Transportation Needs     Within the past 12 months, has lack of transportation kept you from medical appointments, getting your medicines, non-medical meetings or appointments, work, or from getting things that you need?: No   Interpersonal Safety: Low Risk  (2024)    Interpersonal Safety     Do you feel physically and emotionally safe where you currently live?: Yes     Within the past 12 months, have you been hit, slapped, kicked or otherwise physically hurt by someone?: No     Within the past 12 months, have you been humiliated or emotionally abused in other ways by your partner or ex-partner?: No   Housing Stability: Low Risk  (2024)    Housing Stability     Do you have housing? : Yes     Are you worried about losing your housing?: No       ROS: 10 point ROS neg other than the symptoms noted above in the HPI.  EXAM  Constitutional: healthy, alert, and no distress    Psychiatric: mentation appears normal and affect  normal/bright    VASCULAR:  -Dorsalis pedis pulse +2/4 b/l  -Posterior tibial pulse +2/4 b/l  -Capillary refill time < 3 seconds to b/l hallux  -Hair growth Present to b/l anterior legs and ankles  NEURO:  -Light touch sensation intact to b/l plantar forefoot  DERM:  -Skin temperature, texture and turgor WNL b/l    MSK:  -Pain on palpation to the rim of the bilateral heel along the central medial, central lateral, and central posterior rim of the heel  -Pain on palpation to the bilateral plantar central heel  -Muscle strength of ankles +5/5 for dorsiflexion, plantarflexion, ABDUction and ADDuction b/l    -Ankle joint passive ROM within normal limits except for dorsiflexion:    Dorsiflexion, RIGHT Straight knee 0 degrees    Dorsiflexion, LEFT Straight knee 0 degrees    ============================================================    ASSESSMENT:  (M62.461) Gastrocnemius equinus of right lower extremity  (primary encounter diagnosis)    (M77.51) Right calcaneal bursitis    (M77.52) Bursitis of heel, left    (G57.91) Neuritis of right foot    (G57.92) Neuritis of left foot    (M62.462) Gastrocnemius equinus of left lower extremity      PLAN:  -Patient evaluated and examined. Treatment options discussed with no educational barriers noted.  Bursitis and neuritis pain of bilateral heel:  -Discussed bilateral heel pain including potential etiologies and treatment options. Patient's pain was likely started due to a combination of factors that can include but are not limited to worn or improper shoe gear, sudden change in shoe gear, change in activity, imbalanced biomechanics (such as the patient's bilateral equinus deformity of the calf muscles). There are multiple nerves that wrap around the heel and can cause pain in the heel when there is too much pressure on the nerves.  ---Discussed conservative treatment options including compression socks, icing, elevating, resting, injections, PT, change in shoe gear (including proper  shoe gear around the house), night splints. At this time, patient would like to proceed with the below treatment options:    -Stability Shoe Gear: This involves wearing a solid tennis shoe that bends at the toe, but has a solid midfoot portion of the shoe that does not bend or twist in half, and a rigid heel contour.   -----Sullivan, Asics, and New Balance are a few brands that have several types of stability tennis shoes. However, these brands also carry lightweight shoes that do not meet the above criteria, so look for a stability tennis shoe.  -----Sullivan tend to have a wider toe box if you have difficulty finding wide enough shoes for your feet.   -----Any brand of can be worn as long as it meets the above three criteria.  -Compression socks: Advised to wear compression socks all day (especially when working) to decrease LOWER EXTREMITY swelling in both feet and legs. Apply the socks first thing in the morning before getting out of bed and remove them at night when the feet are elevated in bed. Look for 15-30mmHg (the amount of compression).  -Stretching: Stretch the calf muscles to increase flexibility of the calf muscles. If possible, aim to stretch the calf muscles for a combined total of one hour per day.  -Icing: Ice the painful area of the foot minimally once a day for ten minutes per foot (can be a frozen water bottle if pain is on the bottom surface of the foot). Ice after any extended amount of time on your feet.  -Consider supportive sandals for around the house (such as [place], Vionics, Birkenstocks, Keens, Merrells, or Oofos sandals)    -Custom inserts will be ordered today -- you will be called to make an appointment    -This is an acute, uncomplicated illness/injury with OTC treatment options reviewed.    -Patient in agreement with the above treatment plan and all of patient's questions were answered.      Return to clinic three months to evaluate bilateral heel pain        Breana Mi DPM

## 2024-03-14 ENCOUNTER — HOSPITAL ENCOUNTER (OUTPATIENT)
Dept: MRI IMAGING | Facility: HOSPITAL | Age: 54
Discharge: HOME | End: 2024-03-14
Attending: PHYSICIAN ASSISTANT | Admitting: PHYSICIAN ASSISTANT
Payer: COMMERCIAL

## 2024-03-14 DIAGNOSIS — G44.82 ORGASMIC HEADACHE: Primary | ICD-10-CM

## 2024-03-14 DIAGNOSIS — G44.82 HEADACHE ASSOCIATED WITH ORGASM: ICD-10-CM

## 2024-03-14 PROCEDURE — 70553 MRI BRAIN STEM W/O & W/DYE: CPT

## 2024-03-14 PROCEDURE — A9585 GADOBUTROL INJECTION: HCPCS | Performed by: RADIOLOGY

## 2024-03-14 PROCEDURE — 255N000002 HC RX 255 OP 636: Performed by: RADIOLOGY

## 2024-03-14 RX ORDER — GADOBUTROL 604.72 MG/ML
10 INJECTION INTRAVENOUS ONCE
Status: COMPLETED | OUTPATIENT
Start: 2024-03-14 | End: 2024-03-14

## 2024-03-14 RX ADMIN — GADOBUTROL 10 ML: 604.72 INJECTION INTRAVENOUS at 10:24

## 2024-03-15 NOTE — RESULT ENCOUNTER NOTE
Your brain doesn't show a subarachnoid hemorrhage or aneurysm which is good.  But I think seeing a neurologist is a good idea. The work up for this can get complicated if it persists. I am going to refer you to [facility] for this if your insurance covers this.  Can you check and make sure. I have no idea how to do that.

## 2024-05-29 ENCOUNTER — OFFICE VISIT (OUTPATIENT)
Dept: FAMILY MEDICINE | Facility: OTHER | Age: 54
End: 2024-05-29
Attending: PHYSICIAN ASSISTANT
Payer: COMMERCIAL

## 2024-05-29 VITALS
HEIGHT: 71 IN | OXYGEN SATURATION: 96 % | RESPIRATION RATE: 16 BRPM | HEART RATE: 62 BPM | SYSTOLIC BLOOD PRESSURE: 125 MMHG | WEIGHT: 204.3 LBS | BODY MASS INDEX: 28.6 KG/M2 | TEMPERATURE: 97.3 F | DIASTOLIC BLOOD PRESSURE: 81 MMHG

## 2024-05-29 DIAGNOSIS — E78.5 HYPERLIPIDEMIA LDL GOAL <100: Primary | ICD-10-CM

## 2024-05-29 LAB
ALT SERPL W P-5'-P-CCNC: 42 U/L (ref 0–70)
AST SERPL W P-5'-P-CCNC: 29 U/L (ref 0–45)
CHOLEST SERPL-MCNC: 166 MG/DL
FASTING STATUS PATIENT QL REPORTED: YES
HDLC SERPL-MCNC: 49 MG/DL
LDLC SERPL CALC-MCNC: 98 MG/DL
NONHDLC SERPL-MCNC: 117 MG/DL
TRIGL SERPL-MCNC: 97 MG/DL

## 2024-05-29 PROCEDURE — 84460 ALANINE AMINO (ALT) (SGPT): CPT | Performed by: PHYSICIAN ASSISTANT

## 2024-05-29 PROCEDURE — 80061 LIPID PANEL: CPT | Performed by: PHYSICIAN ASSISTANT

## 2024-05-29 PROCEDURE — 36415 COLL VENOUS BLD VENIPUNCTURE: CPT | Performed by: PHYSICIAN ASSISTANT

## 2024-05-29 PROCEDURE — 84450 TRANSFERASE (AST) (SGOT): CPT | Performed by: PHYSICIAN ASSISTANT

## 2024-05-29 PROCEDURE — 99213 OFFICE O/P EST LOW 20 MIN: CPT | Performed by: PHYSICIAN ASSISTANT

## 2024-05-29 NOTE — PROGRESS NOTES
"  Assessment & Plan     Hyperlipidemia LDL goal <100  His wife is here today. They are team in his health.  He is trying to exercise. And his medication.             See Patient Instructions    No follow-ups on file.    Ilan Reyes is a 53 year old, presenting for the following health issues:  Lipids        5/29/2024    10:30 AM   Additional Questions   Roomed by Lennie Cabrera   Accompanied by spouse         5/29/2024    10:30 AM   Patient Reported Additional Medications   Patient reports taking the following new medications none     HPI     Hyperlipidemia Follow-Up    Are you regularly taking any medication or supplement to lower your cholesterol?   Yes- crestor  Are you having muscle aches or other side effects that you think could be caused by your cholesterol lowering medication?  No          Review of Systems  Constitutional, HEENT, cardiovascular, pulmonary, gi and gu systems are negative, except as otherwise noted.      Objective    /81 (BP Location: Right arm, Patient Position: Sitting, Cuff Size: Adult Regular)   Pulse 62   Temp 97.3  F (36.3  C) (Tympanic)   Resp 16   Ht 1.803 m (5' 11\")   Wt 92.7 kg (204 lb 4.8 oz)   SpO2 96%   BMI 28.49 kg/m    Body mass index is 28.49 kg/m .  Physical Exam   GENERAL: alert and no distress  NECK: no adenopathy, no asymmetry, masses, or scars  RESP: lungs clear to auscultation - no rales, rhonchi or wheezes  CV: regular rate and rhythm, normal S1 S2, no S3 or S4, no murmur, click or rub, no peripheral edema  ABDOMEN: soft, nontender, no hepatosplenomegaly, no masses and bowel sounds normal  MS: no gross musculoskeletal defects noted, no edema  SKIN: no suspicious lesions or rashes  PSYCH: mentation appears normal, affect normal/bright    Office Visit on 02/29/2024   Component Date Value Ref Range Status    Cholesterol 02/29/2024 235 (H)  <200 mg/dL Final    Triglycerides 02/29/2024 177 (H)  <150 mg/dL Final    Direct Measure HDL 02/29/2024 48  >=40 mg/dL " Final    LDL Cholesterol Calculated 02/29/2024 152 (H)  <=100 mg/dL Final    Non HDL Cholesterol 02/29/2024 187 (H)  <130 mg/dL Final    Patient Fasting > 8hrs? 02/29/2024 Yes   Final           Signed Electronically by: RUSLAN Beth

## 2024-05-29 NOTE — RESULT ENCOUNTER NOTE
WOW!  Great job on lifestyle and compliance with medications.  Keep up the good work . We will  continue on 10 mg. I will send this in when needed. See you back in 6 months.

## 2024-06-11 ENCOUNTER — OFFICE VISIT (OUTPATIENT)
Dept: PODIATRY | Facility: OTHER | Age: 54
End: 2024-06-11
Attending: PODIATRIST
Payer: COMMERCIAL

## 2024-06-11 VITALS
SYSTOLIC BLOOD PRESSURE: 126 MMHG | TEMPERATURE: 97.4 F | OXYGEN SATURATION: 95 % | DIASTOLIC BLOOD PRESSURE: 83 MMHG | HEART RATE: 60 BPM

## 2024-06-11 DIAGNOSIS — M62.462 GASTROCNEMIUS EQUINUS OF LEFT LOWER EXTREMITY: ICD-10-CM

## 2024-06-11 DIAGNOSIS — M62.461 GASTROCNEMIUS EQUINUS OF RIGHT LOWER EXTREMITY: ICD-10-CM

## 2024-06-11 DIAGNOSIS — M72.2 PLANTAR FASCIITIS OF RIGHT FOOT: Primary | ICD-10-CM

## 2024-06-11 PROCEDURE — 99212 OFFICE O/P EST SF 10 MIN: CPT | Performed by: PODIATRIST

## 2024-06-11 ASSESSMENT — PAIN SCALES - GENERAL: PAINLEVEL: NO PAIN (0)

## 2024-06-11 NOTE — PROGRESS NOTES
Chief complaint: Patient presents with:  Musculoskeletal Problem: Follow up      History of Present Illness: This 53 year old male is seen for follow-up management of bilateral foot plantar fasciitis pain. He received CMOs through the orthotist, Ethel Jain around mid May, 2024. He is now wearing them a lot and they are now comfortable. It took an adjustment period for his pain. He is not wearing any certain types of shoes at home. He is considering Oofos for home. Overall, his LEFT foot pain has almost fully resolved and his RIGHT foot has minimal pain. He works on calf stretches every morning.    No further pedal complaints today.       /83 (BP Location: Left arm, Patient Position: Sitting, Cuff Size: Adult Regular)   Pulse 60   Temp 97.4  F (36.3  C) (Tympanic)   SpO2 95%     Patient Active Problem List   Diagnosis    Mixed hyperlipidemia    URBINA (dyspnea on exertion)    Diverticulosis of large intestine without hemorrhage    Elevated coronary artery calcium score    Headache associated with orgasm       Past Surgical History:   Procedure Laterality Date    APPENDECTOMY      COLONOSCOPY N/A 1/6/2023    Procedure: Colonoscopy with  polypectomy;  Surgeon: Griffin Gutierrez MD;  Location: [facility]       Current Outpatient Medications   Medication Sig Dispense Refill    rosuvastatin (CRESTOR) 10 MG tablet Take 1 tablet (10 mg) by mouth daily 90 tablet 1     No current facility-administered medications for this visit.          Allergies   Allergen Reactions    Other [No Clinical Screening - See Comments]      peas    Penicillins        Family History   Problem Relation Age of Onset    Pancreatic Cancer Father     Diabetes Maternal Grandmother     C.A.D. Other         mothers side       Social History     Socioeconomic History    Marital status:      Spouse name: None    Number of children: None    Years of education: None    Highest education level: None   Tobacco Use    Smoking status: Former      Packs/day: 1     Types: Cigarettes     Quit date: 2024     Years since quittin.0    Smokeless tobacco: Never    Tobacco comments:     Passive Exposure (NO)     In the process of quitting 24   Vaping Use    Vaping Use: Never used   Substance and Sexual Activity    Alcohol use: Yes     Comment: Rarely    Drug use: No    Sexual activity: Yes   Other Topics Concern    Caffeine Concern Yes     Comment: Coffee     Social Determinants of Health     Financial Resource Strain: Low Risk  (2024)    Financial Resource Strain     Within the past 12 months, have you or your family members you live with been unable to get utilities (heat, electricity) when it was really needed?: No   Food Insecurity: Low Risk  (2024)    Food Insecurity     Within the past 12 months, did you worry that your food would run out before you got money to buy more?: No     Within the past 12 months, did the food you bought just not last and you didn t have money to get more?: No   Transportation Needs: Low Risk  (2024)    Transportation Needs     Within the past 12 months, has lack of transportation kept you from medical appointments, getting your medicines, non-medical meetings or appointments, work, or from getting things that you need?: No   Interpersonal Safety: Low Risk  (2024)    Interpersonal Safety     Do you feel physically and emotionally safe where you currently live?: Yes     Within the past 12 months, have you been hit, slapped, kicked or otherwise physically hurt by someone?: No     Within the past 12 months, have you been humiliated or emotionally abused in other ways by your partner or ex-partner?: No   Housing Stability: Low Risk  (2024)    Housing Stability     Do you have housing? : Yes     Are you worried about losing your housing?: No       ROS: 10 point ROS neg other than the symptoms noted above in the HPI.  EXAM  Constitutional: healthy, alert, and no distress    Psychiatric: mentation appears  normal and affect normal/bright    VASCULAR:  -Dorsalis pedis pulse +2/4 b/l  -Posterior tibial pulse +2/4 b/l  -Capillary refill time < 3 seconds to b/l hallux  -Hair growth Present to b/l anterior legs and ankles  NEURO:  -Light touch sensation intact to b/l plantar forefoot  DERM:  -Skin temperature, texture and turgor WNL b/l  MSK:  -Minimal tenderness on the mid plantar central arch of the RIGHT foot  -Pain on palpation to the bilateral plantar central heel is resolved bilateral   -Muscle strength of ankles +5/5 for dorsiflexion, plantarflexion, ABDUction and ADDuction b/l    -Ankle joint passive ROM within normal limits except for dorsiflexion:    Dorsiflexion, RIGHT Straight knee 0 degrees    Dorsiflexion, LEFT Straight knee 0 degrees    ============================================================    ASSESSMENT:    (M72.2) Plantar fasciitis of right foot  (primary encounter diagnosis)    (M62.462) Gastrocnemius equinus of left lower extremity    (M62.461) Gastrocnemius equinus of right lower extremity       PLAN:  -Patient evaluated and examined. Treatment options discussed with no educational barriers noted.  Bursitis and neuritis pain of bilateral heel:  -The pain has almost fully resolved on the LEFT foot and is improved on the RIGHT foot. Pain has continued to improve. He is advised to continue wearing the CMOs. If the CMOs are not comfortable or if the pain is not improving, then he is advised to call Ethel to have the orthotics adjusted.    -Ice the plantar foot over a sock for 10-20 minutes when pain flares.  -Wear supportive shoe gear at home.  -Patient is advised to call the clinic if pain worsens, but he says his bilateral foot pain has improved.  -Patient in agreement with the above treatment plan and all of patient's questions were answered.      Return to clinic as needed        Breana Mi DPM

## 2024-08-16 DIAGNOSIS — E78.2 MIXED HYPERLIPIDEMIA: ICD-10-CM

## 2024-08-16 RX ORDER — ROSUVASTATIN CALCIUM 10 MG/1
10 TABLET, COATED ORAL DAILY
Qty: 90 TABLET | Refills: 3 | Status: SHIPPED | OUTPATIENT
Start: 2024-08-16

## 2024-09-15 ENCOUNTER — HEALTH MAINTENANCE LETTER (OUTPATIENT)
Age: 54
End: 2024-09-15

## 2024-09-30 ENCOUNTER — OFFICE VISIT (OUTPATIENT)
Dept: FAMILY MEDICINE | Facility: OTHER | Age: 54
End: 2024-09-30
Attending: PHYSICIAN ASSISTANT
Payer: COMMERCIAL

## 2024-09-30 VITALS
SYSTOLIC BLOOD PRESSURE: 110 MMHG | BODY MASS INDEX: 28.37 KG/M2 | WEIGHT: 203.4 LBS | TEMPERATURE: 97.4 F | OXYGEN SATURATION: 93 % | RESPIRATION RATE: 16 BRPM | DIASTOLIC BLOOD PRESSURE: 78 MMHG | HEART RATE: 57 BPM

## 2024-09-30 DIAGNOSIS — E78.2 MIXED HYPERLIPIDEMIA: ICD-10-CM

## 2024-09-30 DIAGNOSIS — F17.200 TOBACCO USE DISORDER: Primary | ICD-10-CM

## 2024-09-30 LAB
ALT SERPL W P-5'-P-CCNC: 37 U/L (ref 0–70)
AST SERPL W P-5'-P-CCNC: 29 U/L (ref 0–45)
CHOLEST SERPL-MCNC: 180 MG/DL
FASTING STATUS PATIENT QL REPORTED: YES
HDLC SERPL-MCNC: 53 MG/DL
LDLC SERPL CALC-MCNC: 107 MG/DL
NONHDLC SERPL-MCNC: 127 MG/DL
TRIGL SERPL-MCNC: 101 MG/DL

## 2024-09-30 PROCEDURE — 99213 OFFICE O/P EST LOW 20 MIN: CPT | Performed by: PHYSICIAN ASSISTANT

## 2024-09-30 PROCEDURE — 84450 TRANSFERASE (AST) (SGOT): CPT | Performed by: PHYSICIAN ASSISTANT

## 2024-09-30 PROCEDURE — 80061 LIPID PANEL: CPT | Performed by: PHYSICIAN ASSISTANT

## 2024-09-30 PROCEDURE — 36415 COLL VENOUS BLD VENIPUNCTURE: CPT | Performed by: PHYSICIAN ASSISTANT

## 2024-09-30 PROCEDURE — 84460 ALANINE AMINO (ALT) (SGPT): CPT | Performed by: PHYSICIAN ASSISTANT

## 2024-09-30 ASSESSMENT — PAIN SCALES - GENERAL: PAINLEVEL: NO PAIN (0)

## 2024-09-30 NOTE — PROGRESS NOTES
"  Assessment & Plan     Tobacco use disorder  He is smoking on and off.  He is trying cold turkey method. Has done all hte different types of assistance but did not feel any of them helped him.  On occasion he might use gum.     Mixed hyperlipidemia  Recheck his levels. On Crestor and tolerating the medications.  We will see where levels are at and if needing adjustment.   - Lipid Profile (Chol, Trig, HDL, LDL calc); Future  - AST; Future  - ALT; Future          BMI  Estimated body mass index is 28.37 kg/m  as calculated from the following:    Height as of 24: 1.803 m (5' 11\").    Weight as of this encounter: 92.3 kg (203 lb 6.4 oz).         See Patient Instructions    No follow-ups on file.    Ilan Reyes is a 53 year old, presenting for the following health issues:  Follow Up      2024    10:37 AM   Additional Questions   Roomed by Sada Jones lpn   Accompanied by self     HPI     Hyperlipidemia Follow-Up    Are you regularly taking any medication or supplement to lower your cholesterol?   Yes- crestor  Are you having muscle aches or other side effects that you think could be caused by your cholesterol lowering medication?  No    Nicotine/Tobacco Cessation  Eric ECHEVERRIA Melara presents to discuss nicotine/tobacco cessation.    Tobacco Use    Smoking status: Former     Current packs/day: 0.00     Types: Cigarettes     Quit date: 2024     Years since quittin.5    Smokeless tobacco: Never    Tobacco comments:     Passive Exposure (NO)     In the process of quitting 24   Vaping Use    Vaping status: Never Used   Substance and Sexual Activity    Alcohol use: Yes     Comment: Rarely    Drug use: No    Sexual activity: Yes         2024    10:35 AM   Smoking Cessation - Willing To Make Quit Attempt   If I could quit smoking, I would.  5   I want to quit smoking because I worry about how smoking affects my health.   4   I would be willing to make a plan to quit smoking.  4   I would " be willing to cut down my number of cigarettes before quiting.  4   Total Score 17     Prior treatments tried: varenicline (Chantix), bupropion (Zyban), Nicotine patch, and Nicotine gum        Review of Systems  Constitutional, HEENT, cardiovascular, pulmonary, gi and gu systems are negative, except as otherwise noted.      Objective    /78 (BP Location: Right arm, Patient Position: Sitting, Cuff Size: Adult Regular)   Pulse 57   Temp 97.4  F (36.3  C) (Tympanic)   Resp 16   Wt 92.3 kg (203 lb 6.4 oz)   SpO2 93%   BMI 28.37 kg/m    Body mass index is 28.37 kg/m .  Physical Exam   GENERAL: alert and no distress  NECK: no adenopathy, no asymmetry, masses, or scars  RESP: lungs clear to auscultation - no rales, rhonchi or wheezes  CV: regular rate and rhythm, normal S1 S2, no S3 or S4, no murmur, click or rub, no peripheral edema  ABDOMEN: soft, nontender, no hepatosplenomegaly, no masses and bowel sounds normal  MS: no gross musculoskeletal defects noted, no edema  SKIN: no suspicious lesions or rashes  NEURO: Normal strength and tone, mentation intact and speech normal  PSYCH: mentation appears normal, affect normal/bright    No results found for any visits on 09/30/24.        Signed Electronically by: RUSLAN Beth

## 2024-10-01 NOTE — RESULT ENCOUNTER NOTE
Your cholesterol is back up a little bit liver is fine.  We will leave things alone and want you to work on your end with your intake and exercise.

## 2025-02-05 ENCOUNTER — MYC MEDICAL ADVICE (OUTPATIENT)
Dept: FAMILY MEDICINE | Facility: OTHER | Age: 55
End: 2025-02-05

## (undated) DEVICE — CONNECTOR ERBEFLO 2 PORT 20325-215

## (undated) DEVICE — SOL WATER IRRIG 1000ML BOTTLE 2F7114

## (undated) DEVICE — TUBING SUCTION 20FT N620A

## (undated) DEVICE — SNARE ROT MED OVAL PLYPCTM 20MM/195CML/2.4MM M00561831

## (undated) DEVICE — ENDO TRAP POLYP E-TRAP 00711099

## (undated) RX ORDER — PROPOFOL 10 MG/ML
INJECTION, EMULSION INTRAVENOUS
Status: DISPENSED
Start: 2023-01-06